# Patient Record
Sex: MALE | Race: WHITE | NOT HISPANIC OR LATINO | Employment: FULL TIME | URBAN - METROPOLITAN AREA
[De-identification: names, ages, dates, MRNs, and addresses within clinical notes are randomized per-mention and may not be internally consistent; named-entity substitution may affect disease eponyms.]

---

## 2018-05-01 ENCOUNTER — OFFICE VISIT (OUTPATIENT)
Dept: MEDICAL GROUP | Facility: PHYSICIAN GROUP | Age: 48
End: 2018-05-01
Payer: COMMERCIAL

## 2018-05-01 VITALS
SYSTOLIC BLOOD PRESSURE: 124 MMHG | HEIGHT: 70 IN | OXYGEN SATURATION: 97 % | WEIGHT: 145 LBS | RESPIRATION RATE: 14 BRPM | DIASTOLIC BLOOD PRESSURE: 70 MMHG | BODY MASS INDEX: 20.76 KG/M2 | TEMPERATURE: 98.1 F | HEART RATE: 86 BPM

## 2018-05-01 DIAGNOSIS — Z23 NEED FOR VACCINATION: ICD-10-CM

## 2018-05-01 DIAGNOSIS — Z00.00 PREVENTATIVE HEALTH CARE: ICD-10-CM

## 2018-05-01 DIAGNOSIS — Z72.0 TOBACCO ABUSE: ICD-10-CM

## 2018-05-01 DIAGNOSIS — F33.41 RECURRENT MAJOR DEPRESSIVE DISORDER, IN PARTIAL REMISSION (HCC): ICD-10-CM

## 2018-05-01 PROCEDURE — 90715 TDAP VACCINE 7 YRS/> IM: CPT | Performed by: NURSE PRACTITIONER

## 2018-05-01 PROCEDURE — 90471 IMMUNIZATION ADMIN: CPT | Performed by: NURSE PRACTITIONER

## 2018-05-01 PROCEDURE — 99203 OFFICE O/P NEW LOW 30 MIN: CPT | Mod: 25 | Performed by: NURSE PRACTITIONER

## 2018-05-01 ASSESSMENT — PAIN SCALES - GENERAL: PAINLEVEL: NO PAIN

## 2018-05-01 ASSESSMENT — PATIENT HEALTH QUESTIONNAIRE - PHQ9: CLINICAL INTERPRETATION OF PHQ2 SCORE: 0

## 2018-05-01 NOTE — ASSESSMENT & PLAN NOTE
Has not been able to afford chantix since insurance changed.  Would like to stop smoking.  Did not do well on Wellbutrin in the past.

## 2018-05-01 NOTE — PROGRESS NOTES
"Gera Velez is a 47 y.o. male here today to establish care and for evaluation and management of:    HPI:      Recurrent major depressive disorder, in partial remission (HCC)  Reports ups and downs every 2-3 weeks.  Some suicidal thoughts. Feels safe at this time.  Looking at CBD oil, does not want to do any meds at this time.     Tobacco abuse  Has not been able to afford chantix since insurance changed.  Would like to stop smoking.  Did not do well on Wellbutrin in the past.       Current medicines (including changes today)  No current outpatient prescriptions on file.     No current facility-administered medications for this visit.        He  has a past medical history of Allergy and Depression.    He  has no past surgical history on file.    Social History   Substance Use Topics   • Smoking status: Current Every Day Smoker     Packs/day: 0.50     Years: 33.00     Types: Cigarettes   • Smokeless tobacco: Never Used   • Alcohol use 4.2 oz/week     7 Glasses of wine per week       Social History     Social History Narrative   • No narrative on file       Family History   Problem Relation Age of Onset   • Diabetes Mother    • Cancer Mother      brain   • Cancer Father      colon       Family Status   Relation Status   • Mother    • Father          ROS  As stated in hpi  All other systems reviewed and are negative     Objective:     Blood pressure 124/70, pulse 86, temperature 36.7 °C (98.1 °F), resp. rate 14, height 1.778 m (5' 10\"), weight 65.8 kg (145 lb), SpO2 97 %. Body mass index is 20.81 kg/m².  Physical Exam:    Constitutional: Alert, no distress.  Skin: Warm, dry, good turgor, no rashes in visible areas.  Eye: Equal, round and reactive, conjunctiva clear, lids normal.  ENMT: Lips without lesions, good dentition, oropharynx clear.  Neck: Trachea midline, no masses, no thyromegaly. No cervical or supraclavicular lymphadenopathy.  Respiratory: Unlabored respiratory effort, lungs clear to " auscultation, no wheezes, no ronchi.  Cardiovascular: Normal S1, S2, no murmur, no edema.  Abdomen: Soft, non-tender, no masses, no hepatosplenomegaly.  Psych: Alert and oriented x3, normal affect and mood. Denies suicidal ideation, plan or history of previous suicide attempt.         Assessment and Plan:   The following treatment plan was discussed    1. Tobacco abuse  This is a new problem to me.  Chronic, ongoing, unstable.  Discussed smoking cessation with patient.  He is working on decreasing cigarettes at this time.  Unable to afford chantix with current insurance.  Monitor and follow.     2. Recurrent major depressive disorder, in partial remission (HCC)  This is a new problem to me.  Chronic, ongoing.  Patient does not want any pharmacological intervention or therapy at this time.  Is researching CBD oil as a treatment.  Reviewed red flag warnings.  Monitor and follow.     3. Preventative health care  Due for lab work.  Will monitor for any metabolic issues.  Monitor and follow.   - CBC WITH DIFFERENTIAL; Future  - COMP METABOLIC PANEL; Future  - LIPID PROFILE; Future  - TSH; Future  - VITAMIN D,25 HYDROXY; Future    4. Need for vaccination  Due for TDAP today.  No acute illness, consent obtained.  I have placed the below orders and discussed them with an approved delegating provider.  The MA is performing the below orders under the direction of Ashanti Hernandez D.O..    - TDAP VACCINE =>6YO IM      Records requested.  Followup: Return in about 1 year (around 5/1/2019), or if symptoms worsen or fail to improve, for Annual.

## 2018-05-01 NOTE — LETTER
[facility]  LALO Ayala  [address] [place] [address]  Fax: [phone]   Authorization for Release/Disclosure of   Protected Health Information   Name: GERA VELEZ : 1970 SSN: [id number]   Address: [address] [place] [address] [place] [address] Phone:    [phone] (home)    I authorize the entity listed below to release/disclose the PHI below to:   [facility]/LALO Ayala and LALO Ayala   Provider or Entity Name:  Dr Armas   Address   City, [facility]   Phone:      Fax:     Reason for request: continuity of care   Information to be released:    [  ] LAST COLONOSCOPY,  including any PATH REPORT and follow-up  [  ] LAST FIT/COLOGUARD RESULT [  ] LAST DEXA  [  ] LAST MAMMOGRAM  [  ] LAST PAP  [  ] LAST LABS [  ] RETINA EXAM REPORT  [  ] IMMUNIZATION RECORDS  [ x ] Release all info      [  ] Check here and initial the line next to each item to release ALL health information INCLUDING  _____ Care and treatment for drug and / or alcohol abuse  _____ HIV testing, infection status, or AIDS  _____ Genetic Testing    DATES OF SERVICE OR TIME PERIOD TO BE DISCLOSED: _____________  I understand and acknowledge that:  * This Authorization may be revoked at any time by you in writing, except if your health information has already been used or disclosed.  * Your health information that will be used or disclosed as a result of you signing this authorization could be re-disclosed by the recipient. If this occurs, your re-disclosed health information may no longer be protected by State or Federal laws.  * You may refuse to sign this Authorization. Your refusal will not affect your ability to obtain treatment.  * This Authorization becomes effective upon signing and will  on (date) __________.      If no date is indicated, this Authorization will  one (1) year from the signature date.    Name: Gera Velez    Signature:   Date:     2018            PLEASE FAX REQUESTED RECORDS BACK TO: [phone]

## 2018-05-01 NOTE — ASSESSMENT & PLAN NOTE
Reports ups and downs every 2-3 weeks.  Some suicidal thoughts. Feels safe at this time.  Looking at CBD oil, does not want to do any meds at this time.

## 2018-05-04 LAB
25(OH)D3+25(OH)D2 SERPL-MCNC: 37.6 NG/ML (ref 30–100)
ALBUMIN SERPL-MCNC: 4.5 G/DL (ref 3.5–5.5)
ALBUMIN/GLOB SERPL: 1.8 {RATIO} (ref 1.2–2.2)
ALP SERPL-CCNC: 62 IU/L (ref 39–117)
ALT SERPL-CCNC: 16 IU/L (ref 0–44)
AST SERPL-CCNC: 21 IU/L (ref 0–40)
BASOPHILS # BLD AUTO: 0 X10E3/UL (ref 0–0.2)
BASOPHILS NFR BLD AUTO: 0 %
BILIRUB SERPL-MCNC: 0.8 MG/DL (ref 0–1.2)
BUN SERPL-MCNC: 9 MG/DL (ref 6–24)
BUN/CREAT SERPL: 10 (ref 9–20)
CALCIUM SERPL-MCNC: 9.5 MG/DL (ref 8.7–10.2)
CHLORIDE SERPL-SCNC: 101 MMOL/L (ref 96–106)
CHOLEST SERPL-MCNC: 195 MG/DL (ref 100–199)
CO2 SERPL-SCNC: 25 MMOL/L (ref 18–29)
CREAT SERPL-MCNC: 0.93 MG/DL (ref 0.76–1.27)
EOSINOPHIL # BLD AUTO: 0.1 X10E3/UL (ref 0–0.4)
EOSINOPHIL NFR BLD AUTO: 1 %
ERYTHROCYTE [DISTWIDTH] IN BLOOD BY AUTOMATED COUNT: 13 % (ref 12.3–15.4)
GFR SERPLBLD CREATININE-BSD FMLA CKD-EPI: 112 ML/MIN/1.73
GFR SERPLBLD CREATININE-BSD FMLA CKD-EPI: 97 ML/MIN/1.73
GLOBULIN SER CALC-MCNC: 2.5 G/DL (ref 1.5–4.5)
GLUCOSE SERPL-MCNC: 94 MG/DL (ref 65–99)
HCT VFR BLD AUTO: 44 % (ref 37.5–51)
HDLC SERPL-MCNC: 75 MG/DL
HGB BLD-MCNC: 14.8 G/DL (ref 13–17.7)
IMM GRANULOCYTES # BLD: 0 X10E3/UL (ref 0–0.1)
IMM GRANULOCYTES NFR BLD: 0 %
IMMATURE CELLS  115398: NORMAL
LABORATORY COMMENT REPORT: ABNORMAL
LDLC SERPL CALC-MCNC: 100 MG/DL (ref 0–99)
LYMPHOCYTES # BLD AUTO: 2 X10E3/UL (ref 0.7–3.1)
LYMPHOCYTES NFR BLD AUTO: 26 %
MCH RBC QN AUTO: 32 PG (ref 26.6–33)
MCHC RBC AUTO-ENTMCNC: 33.6 G/DL (ref 31.5–35.7)
MCV RBC AUTO: 95 FL (ref 79–97)
MONOCYTES # BLD AUTO: 0.5 X10E3/UL (ref 0.1–0.9)
MONOCYTES NFR BLD AUTO: 7 %
MORPHOLOGY BLD-IMP: NORMAL
NEUTROPHILS # BLD AUTO: 5.1 X10E3/UL (ref 1.4–7)
NEUTROPHILS NFR BLD AUTO: 66 %
NRBC BLD AUTO-RTO: NORMAL %
PLATELET # BLD AUTO: 248 X10E3/UL (ref 150–379)
POTASSIUM SERPL-SCNC: 5.2 MMOL/L (ref 3.5–5.2)
PROT SERPL-MCNC: 7 G/DL (ref 6–8.5)
RBC # BLD AUTO: 4.62 X10E6/UL (ref 4.14–5.8)
SODIUM SERPL-SCNC: 143 MMOL/L (ref 134–144)
TRIGL SERPL-MCNC: 102 MG/DL (ref 0–149)
TSH SERPL DL<=0.005 MIU/L-ACNC: 2.28 UIU/ML (ref 0.45–4.5)
VLDLC SERPL CALC-MCNC: 20 MG/DL (ref 5–40)
WBC # BLD AUTO: 7.7 X10E3/UL (ref 3.4–10.8)

## 2018-05-08 ENCOUNTER — TELEPHONE (OUTPATIENT)
Dept: MEDICAL GROUP | Facility: PHYSICIAN GROUP | Age: 48
End: 2018-05-08

## 2018-05-08 NOTE — TELEPHONE ENCOUNTER
----- Message from LALO Ayala sent at 5/7/2018  4:43 PM PDT -----  Please notify patient that blood sugar, kidney function, liver function, cholesterol, vitamin D, thyroid are all within normal range.  Great job!  LALO Ayala

## 2018-05-08 NOTE — TELEPHONE ENCOUNTER
Phone Number Called: [phone] (home)     Message: Left message to call back, patient notified of normal result    Left Message for patient to call back: N\A

## 2018-11-16 ENCOUNTER — TELEPHONE (OUTPATIENT)
Dept: MEDICAL GROUP | Facility: PHYSICIAN GROUP | Age: 48
End: 2018-11-16

## 2018-11-16 DIAGNOSIS — Z23 NEED FOR VACCINATION: ICD-10-CM

## 2018-11-16 NOTE — TELEPHONE ENCOUNTER
1. Caller Name:                                          Call Back Number:       Patient approves a detailed voicemail message: N\A    Patient is on the MA Schedule 11/19/18 for flu, pneumovax 23 vaccine/injection.    SPECIFIC Action To Be Taken: Orders pending, please sign.

## 2018-11-19 ENCOUNTER — NON-PROVIDER VISIT (OUTPATIENT)
Dept: MEDICAL GROUP | Facility: PHYSICIAN GROUP | Age: 48
End: 2018-11-19
Payer: COMMERCIAL

## 2018-11-19 DIAGNOSIS — Z23 NEED FOR VACCINATION: ICD-10-CM

## 2018-11-19 PROCEDURE — 90472 IMMUNIZATION ADMIN EACH ADD: CPT | Performed by: NURSE PRACTITIONER

## 2018-11-19 PROCEDURE — 90686 IIV4 VACC NO PRSV 0.5 ML IM: CPT | Performed by: NURSE PRACTITIONER

## 2018-11-19 PROCEDURE — 90732 PPSV23 VACC 2 YRS+ SUBQ/IM: CPT | Performed by: NURSE PRACTITIONER

## 2018-11-19 PROCEDURE — 90471 IMMUNIZATION ADMIN: CPT | Performed by: NURSE PRACTITIONER

## 2018-11-19 NOTE — PROGRESS NOTES
"Gera Velez is a 48 y.o. male here for a non-provider visit for:   FLU    Reason for immunization: Annual Flu Vaccine  Immunization records indicate need for vaccine: Yes, confirmed with Epic  Minimum interval has been met for this vaccine: Yes  ABN completed: Not Indicated    Order and dose verified by: JOVANNI ADAME Dated  8/1/2015 was given to patient: Yes  All IAC Questionnaire questions were answered \"No.\"    Patient tolerated injection and no adverse effects were observed or reported: Yes    Pt scheduled for next dose in series: Not Indicated    Gera Velez is a 48 y.o. male here for a non-provider visit for:   PNEUMOVAX (PPSV23)    Reason for immunization: Overdue/Provider Recommended  Immunization records indicate need for vaccine: Yes, confirmed with Epic  Minimum interval has been met for this vaccine: Yes  ABN completed: Not Indicated    Order and dose verified by: JOVANNI ADAME Dated  4/24/2015 was given to patient: Yes  All IAC Questionnaire questions were answered \"No.\"    Patient tolerated injection and no adverse effects were observed or reported: Yes    Pt scheduled for next dose in series: Not Indicated  "

## 2019-05-29 ENCOUNTER — HOSPITAL ENCOUNTER (EMERGENCY)
Facility: MEDICAL CENTER | Age: 49
End: 2019-05-29
Attending: EMERGENCY MEDICINE
Payer: COMMERCIAL

## 2019-05-29 VITALS
WEIGHT: 141.09 LBS | DIASTOLIC BLOOD PRESSURE: 79 MMHG | BODY MASS INDEX: 20.2 KG/M2 | SYSTOLIC BLOOD PRESSURE: 109 MMHG | TEMPERATURE: 97.7 F | OXYGEN SATURATION: 99 % | HEIGHT: 70 IN | HEART RATE: 62 BPM | RESPIRATION RATE: 18 BRPM

## 2019-05-29 DIAGNOSIS — L02.91 ABSCESS: ICD-10-CM

## 2019-05-29 DIAGNOSIS — L03.011 CELLULITIS OF FINGER OF RIGHT HAND: ICD-10-CM

## 2019-05-29 PROCEDURE — 99283 EMERGENCY DEPT VISIT LOW MDM: CPT

## 2019-05-29 PROCEDURE — 303977 HCHG I & D

## 2019-05-29 PROCEDURE — 700102 HCHG RX REV CODE 250 W/ 637 OVERRIDE(OP): Performed by: EMERGENCY MEDICINE

## 2019-05-29 PROCEDURE — A9270 NON-COVERED ITEM OR SERVICE: HCPCS | Performed by: EMERGENCY MEDICINE

## 2019-05-29 RX ORDER — SULFAMETHOXAZOLE AND TRIMETHOPRIM 800; 160 MG/1; MG/1
1 TABLET ORAL 2 TIMES DAILY
Qty: 10 TAB | Refills: 0 | Status: SHIPPED | OUTPATIENT
Start: 2019-05-29 | End: 2019-06-03

## 2019-05-29 RX ORDER — SULFAMETHOXAZOLE AND TRIMETHOPRIM 800; 160 MG/1; MG/1
1 TABLET ORAL ONCE
Status: COMPLETED | OUTPATIENT
Start: 2019-05-29 | End: 2019-05-29

## 2019-05-29 RX ADMIN — SULFAMETHOXAZOLE AND TRIMETHOPRIM 1 TABLET: 800; 160 TABLET ORAL at 19:02

## 2019-05-30 NOTE — ED PROVIDER NOTES
ED Provider Note    Scribed for Matias Perez M.D. by Erendira Jacobs. 5/29/2019,  6:35 PM.    Means of Arrival: Walk in  History obtained from: Patient  History limited by: None    CHIEF COMPLAINT  Chief Complaint   Patient presents with   • Digit Pain     right index finger, swelling and redness noted.        HPI  Gera Velez is a 48 y.o. male who presents to the Emergency Department complaining of left hand digit pain onset 05/21/19. The patient states he was working on an electrical box at home when he got his finger caught in the electrical wires. He then tried to extract his hand from the electrical box and debris became stuck under the fingernail of his index finger of his left hand.  He states he did not get electrocuted. The patient states he cut the the nail down and believed the issue would resolve on its own. He has since developed worsening digit pain radiating up to the knuckle onset yesterday. He has an associated abscess on the fingernail.  The patient denies fever. No alleviating or exacerbating factors noted. He has no major medical problems to report and an allergy to penicillin.     REVIEW OF SYSTEMS  CONSTITUTIONAL:  No fever.  Skin: positive for abscess and digit pain ( index finger of left hand)     See HPI for further details.     PAST MEDICAL HISTORY  Past Medical History:   Diagnosis Date   • Allergy    • Depression        FAMILY HISTORY  Family History   Problem Relation Age of Onset   • Diabetes Mother    • Cancer Mother         brain   • Cancer Father         colon       SOCIAL HISTORY   reports that he has been smoking Cigarettes.  He has a 16.50 pack-year smoking history. He has never used smokeless tobacco. He reports that he drinks about 4.2 oz of alcohol per week . He reports that he uses drugs, including Marijuana.    SURGICAL HISTORY  No past surgical history on file.    CURRENT MEDICATIONS  Home Medications     Reviewed by Tiffanie England R.N. (Registered Nurse) on  "05/29/19 at 1802  Med List Status: Complete   Medication Last Dose Status        Patient Shaun Taking any Medications                       ALLERGIES  Allergies   Allergen Reactions   • Pcn [Penicillins] Unspecified     Patent does not Know         PHYSICAL EXAM  VITAL SIGNS: /67   Pulse 61   Temp 36.5 °C (97.7 °F) (Temporal)   Resp 18   Ht 1.778 m (5' 10\")   Wt 64 kg (141 lb 1.5 oz)   SpO2 99%   BMI 20.24 kg/m²    Gen: alert, no acute distress  HENT: ATNC  Eyes: normal conjuctiva  Resp: No resipiratory distress.   CV: No JVD   Abd: Non-distended  Skin: 1 cm abscess to the fingernail of the distal fingertip, with mild erythema to the  DIP joint   Extremities: No deformity      DIAGNOSTIC STUDIES / PROCEDURES     Incision and drainage  Indication: Abscess  The patient's skin was cleaned with ChloraPrep.  The abscess was opened using an 18-gauge needle as a cutting item to make approximately 7 mm incision, with drainage of approximately 1 to 2 mL's of pus and a small amount of blood.  Patient tolerated the procedure well.  There were no immediate complications.    EBL: 1 mL    COURSE & MEDICAL DECISION MAKING  Pertinent Labs & Imaging studies reviewed. (See chart for details)    6:35 PM Patient seen and examined at bedside. Patient will be treated with Bactrim 800-160 mg for his symptoms.     6:40 PM  The abscess was drained by myself at this time. A 7 cm incision was made and pus was produced.  Sterile dressing was applied. The patient is instructed to keep the area clean and dry. The patient was told to return in 2 days to take the packing out. The patient was told to return if there were increasing signs of infection, such as increasing redness or pain, nausea, vomiting or worsening fever. The patient was given the chance to ask questions. He is understanding and agreeable to discharge.       Medical Decision Making:  Patient presents with abscess of the fingertip.  This was drained at the bedside.  He " does have some surrounding cellulitis, no evidence of flexor tenosynovitis.  She will be discharged home on antibiotics.  He was provided with return precautions     The patient will return for new or worsening symptoms and is stable at the time of discharge.      DISPOSITION:  Patient will be discharged home in stable condition.    FOLLOW UP:  LALO Ayala  [address] [address]  [phone]    In 3 days        OUTPATIENT MEDICATIONS:  Discharge Medication List as of 5/29/2019  7:04 PM      START taking these medications    Details   sulfamethoxazole-trimethoprim (BACTRIM DS) 800-160 MG tablet Take 1 Tab by mouth 2 times a day for 5 days., Disp-10 Tab, R-0, Normal               FINAL IMPRESSION  1. Cellulitis of finger of right hand    2. Abscess          Erendira LAMAR (Scribe), am scribing for, and in the presence of, Matias Perez M.D..    Electronically signed by: Erendira Jacobs (Scribe), 5/29/2019    IMatias M.D. personally performed the services described in this documentation, as scribed by Erendira Jacobs in my presence, and it is both accurate and complete.  E  The note accurately reflects work and decisions made by me.  Matias Perez  5/29/2019  8:55 PM

## 2019-05-30 NOTE — DISCHARGE INSTRUCTIONS
You were seen in the emergency department for an infection in your skin.  This was drained at the bedside.  Please perform warm soaks of your finger several times a day and massage out any pus.  You are being started on an antibiotic to take for several days.  Please take this as prescribed.    Please follow-up with your regular doctor to ensure that you are recovering well.    Please return to the emergency department or seek medical attention if you develop:  Fevers, spreading redness, increasing pain, any other new or concerning findings

## 2019-05-30 NOTE — ED TRIAGE NOTES
Gera Velez  Chief Complaint   Patient presents with   • Digit Pain     right index finger, swelling and redness noted.      Pt ambulatory to triage with above complaint.  VSS.  Pt states injury under (R) index nail,  Increased redness and swelling over last week.  Pt returned to lobby, educated on triage process, and to inform staff of any changes or concerns.

## 2019-06-04 ENCOUNTER — OFFICE VISIT (OUTPATIENT)
Dept: MEDICAL GROUP | Facility: PHYSICIAN GROUP | Age: 49
End: 2019-06-04
Payer: COMMERCIAL

## 2019-06-04 VITALS
HEIGHT: 70 IN | BODY MASS INDEX: 20.04 KG/M2 | WEIGHT: 140 LBS | RESPIRATION RATE: 14 BRPM | TEMPERATURE: 98.9 F | SYSTOLIC BLOOD PRESSURE: 102 MMHG | DIASTOLIC BLOOD PRESSURE: 58 MMHG | OXYGEN SATURATION: 96 % | HEART RATE: 55 BPM

## 2019-06-04 DIAGNOSIS — L03.011 CELLULITIS OF RIGHT INDEX FINGER: ICD-10-CM

## 2019-06-04 PROCEDURE — 99213 OFFICE O/P EST LOW 20 MIN: CPT | Performed by: NURSE PRACTITIONER

## 2019-06-04 NOTE — ASSESSMENT & PLAN NOTE
Was seen in ER last week for abscess right index finger.  It was drained and patient started on BactrimDS.  He has one dose left.  Presents today for ER follow up.  Finger is healing nicely, some clear drainage noted.  No fever, some pain with squeezing.  Not currently doing warm soaks.

## 2019-06-04 NOTE — PROGRESS NOTES
"Chief Complaint   Patient presents with   • Hospital Follow-up   • Finger Injury       Subjective:   Gera Velez is a 48 y.o. male here today for evaluation and management of:    Cellulitis of right index finger  Was seen in ER last week for abscess right index finger.  It was drained and patient started on BactrimDS.  He has one dose left.  Presents today for ER follow up.  Finger is healing nicely, some clear drainage noted.  No fever, some pain with squeezing.  Not currently doing warm soaks.           Current medicines (including changes today)  No current outpatient prescriptions on file.     No current facility-administered medications for this visit.      He  has a past medical history of Allergy and Depression.    ROS as stated in hpi  No chest pain, no shortness of breath, no abdominal pain       Objective:     /58 (BP Location: Left arm, Patient Position: Sitting)   Pulse (!) 55   Temp 37.2 °C (98.9 °F) (Temporal)   Resp 14   Ht 1.778 m (5' 10\")   Wt 63.5 kg (140 lb)   SpO2 96%  Body mass index is 20.09 kg/m². afebrile  Physical Exam:  Constitutional: Alert, no distress.  Skin: Warm, dry, good turgor,no cyanosis, no rashes in visible areas. Right index finger tip with healing lesion.  No streaking, pus or foreign body noted at this time.   Eye: Equal, round and reactive, conjunctiva clear, lids normal.  Ears: No tenderness, no discharge.  External canals are without any significant edema or erythema.   Gross auditory acuity is intact.  Nose: symmetrical without tenderness, no discharge.  Mouth/Throat: lips without lesion.  Oropharynx clear.  Neck: Trachea midline, no masses, no obvious thyroid enlargement Range of motion within normal limits.  Neuro: Cranial nerves 2-12 grossly intact.  No sensory deficit.  Psych: Alert and oriented x3, normal affect and mood and judgement.        Assessment and Plan:   The following treatment plan was discussed    1. Cellulitis of right index " finger  This is a new problem to me.  Acute, ER follow up.  Improving and clear exudate is seen.  Finish Bactrim as prescribed.  Advised to add warm epsom salt soaks bid.  Red flags reviewed.  Monitor and follow.       Followup: Return if symptoms worsen or fail to improve.         Educated in proper administration of medication(s) ordered today including safety, possible SE, risks, benefits, rationale and alternatives to therapy.     Please note that this dictation was created using voice recognition software. I have made every reasonable attempt to correct obvious errors, but I expect that there are errors of grammar and possibly content that I did not discover before finalizing the note.

## 2019-07-25 ENCOUNTER — PATIENT MESSAGE (OUTPATIENT)
Dept: MEDICAL GROUP | Facility: PHYSICIAN GROUP | Age: 49
End: 2019-07-25

## 2020-06-01 ENCOUNTER — OFFICE VISIT (OUTPATIENT)
Dept: MEDICAL GROUP | Facility: PHYSICIAN GROUP | Age: 50
End: 2020-06-01
Payer: COMMERCIAL

## 2020-06-01 VITALS
WEIGHT: 145 LBS | DIASTOLIC BLOOD PRESSURE: 70 MMHG | HEIGHT: 70 IN | BODY MASS INDEX: 20.76 KG/M2 | TEMPERATURE: 98.5 F | SYSTOLIC BLOOD PRESSURE: 110 MMHG | HEART RATE: 60 BPM | OXYGEN SATURATION: 97 % | RESPIRATION RATE: 16 BRPM

## 2020-06-01 DIAGNOSIS — F33.41 RECURRENT MAJOR DEPRESSIVE DISORDER, IN PARTIAL REMISSION (HCC): ICD-10-CM

## 2020-06-01 PROCEDURE — 99214 OFFICE O/P EST MOD 30 MIN: CPT | Performed by: NURSE PRACTITIONER

## 2020-06-01 ASSESSMENT — PATIENT HEALTH QUESTIONNAIRE - PHQ9
7. TROUBLE CONCENTRATING ON THINGS, SUCH AS READING THE NEWSPAPER OR WATCHING TELEVISION: MORE THAN HALF THE DAYS
6. FEELING BAD ABOUT YOURSELF - OR THAT YOU ARE A FAILURE OR HAVE LET YOURSELF OR YOUR FAMILY DOWN: MORE THAN HALF THE DAYS
SUM OF ALL RESPONSES TO PHQ9 QUESTIONS 1 AND 2: 3
8. MOVING OR SPEAKING SO SLOWLY THAT OTHER PEOPLE COULD HAVE NOTICED. OR THE OPPOSITE, BEING SO FIGETY OR RESTLESS THAT YOU HAVE BEEN MOVING AROUND A LOT MORE THAN USUAL: SEVERAL DAYS
5. POOR APPETITE OR OVEREATING: MORE THAN HALF THE DAYS
4. FEELING TIRED OR HAVING LITTLE ENERGY: MORE THAN HALF THE DAYS
1. LITTLE INTEREST OR PLEASURE IN DOING THINGS: SEVERAL DAYS
3. TROUBLE FALLING OR STAYING ASLEEP OR SLEEPING TOO MUCH: MORE THAN HALF THE DAYS
9. THOUGHTS THAT YOU WOULD BE BETTER OFF DEAD, OR OF HURTING YOURSELF: SEVERAL DAYS
2. FEELING DOWN, DEPRESSED, IRRITABLE, OR HOPELESS: MORE THAN HALF THE DAYS
SUM OF ALL RESPONSES TO PHQ QUESTIONS 1-9: 15

## 2020-06-01 NOTE — ASSESSMENT & PLAN NOTE
"Reports worsening depression with waves of peaks and valleys.  Seems to be 90 days cycle.  No manic behaviors reported.  Lack of energy, lack of interest in food, guilt associated.  Has in the past only exercised, never seen a therapist. Reports recent 6 months-1 year of difficulty concentrating, dyslexia.   No reported suicidal plan, but thinks about 'what if I just hung myself in my closet\".  No previous suicide attempts.  Took wellbutrin for smoking cessation.  Afraid of taking medication   No reported panic attacks. Some random chest pain reported over the last year.  No shortness of breath reported, numbness or tingling.  Denies hallucinations.   "

## 2020-06-01 NOTE — PROGRESS NOTES
"Chief Complaint   Patient presents with   • Follow-Up   • Depression       Subjective:   Gera Velez is a 49 y.o. male here today for evaluation and management of:    Recurrent major depressive disorder, in partial remission (HCC)  Reports worsening depression with waves of peaks and valleys.  Seems to be 90 days cycle.  No manic behaviors reported.  Lack of energy, lack of interest in food, guilt associated.  Has in the past only exercised, never seen a therapist. Reports recent 6 months-1 year of difficulty concentrating, dyslexia.   No reported suicidal plan, but thinks about 'what if I just hung myself in my closet\".  No previous suicide attempts.  Took wellbutrin for smoking cessation.  Afraid of taking medication   No reported panic attacks. Some random chest pain reported over the last year.  No shortness of breath reported, numbness or tingling.  Denies hallucinations.            Current medicines (including changes today)  No current outpatient medications on file.     No current facility-administered medications for this visit.      He  has a past medical history of Allergy and Depression.    ROS as stated in hpi  No chest pain, no shortness of breath, no abdominal pain       Objective:     /70 (BP Location: Left arm, Patient Position: Sitting)   Pulse 60   Temp 36.9 °C (98.5 °F) (Temporal)   Resp 16   Ht 1.778 m (5' 10\")   Wt 65.8 kg (145 lb)   SpO2 97%  Body mass index is 20.81 kg/m².   Physical Exam:  Constitutional: Alert, no distress.  Skin: Warm, dry, good turgor,no cyanosis, no rashes in visible areas.  Eye: Equal, round and reactive, conjunctiva clear, lids normal.  Ears: No tenderness, no discharge.  External canals are without any significant edema or erythema.  Tympanic membranes are without any inflammation, no effusion.  Gross auditory acuity is intact.  Nose: symmetrical without tenderness, no discharge.  Mouth/Throat: lips without lesion.  Oropharynx clear.  Throat " without erythema, exudates or tonsillar enlargement.  Neck: Trachea midline, no masses, no obvious thyroid enlargement.. No cervical or supraclavicular lymphadenopathy. Range of motion within normal limits.  Neuro: Cranial nerves 2-12 grossly intact.  No sensory deficit.  Respiratory: Unlabored respiratory effort, lungs clear to auscultation, no wheezes, no ronchi.  Cardiovascular: Normal S1, S2, no murmur, no edema.  Abdomen: Soft, non-tender, no masses, no guarding,  no hepatosplenomegaly.  Psych: Alert and oriented x3, normal affect and mood and judgement.        Assessment and Plan:   The following treatment plan was discussed    1. Recurrent major depressive disorder, in partial remission (HCC)  New problem to me.  Acute exacerbation of chronic issue.  Labs ordered. Referral to behavioral health.  Discussed importance of therapy.  Discussed possible medications.  Will rule out any metabolic cause.  Monitor and follow.  ER precautions reviewed at length with patient.  Monitor and follow closely.   - CBC WITH DIFFERENTIAL; Future  - Comp Metabolic Panel; Future  - TSH; Future  - FREE THYROXINE; Future  - VITAMIN D,25 HYDROXY; Future  - Lipid Profile; Future  - REFERRAL TO BEHAVIORAL HEALTH      Followup: Return in about 2 months (around 8/1/2020) for depression.         Educated in proper administration of medication(s) ordered today including safety, possible SE, risks, benefits, rationale and alternatives to therapy.     Please note that this dictation was created using voice recognition software. I have made every reasonable attempt to correct obvious errors, but I expect that there are errors of grammar and possibly content that I did not discover before finalizing the note.

## 2020-06-02 ENCOUNTER — HOSPITAL ENCOUNTER (OUTPATIENT)
Dept: LAB | Facility: MEDICAL CENTER | Age: 50
End: 2020-06-02
Attending: NURSE PRACTITIONER
Payer: COMMERCIAL

## 2020-06-02 DIAGNOSIS — F33.41 RECURRENT MAJOR DEPRESSIVE DISORDER, IN PARTIAL REMISSION (HCC): ICD-10-CM

## 2020-06-02 LAB
25(OH)D3 SERPL-MCNC: 41 NG/ML (ref 30–100)
ALBUMIN SERPL BCP-MCNC: 4.7 G/DL (ref 3.2–4.9)
ALBUMIN/GLOB SERPL: 2 G/DL
ALP SERPL-CCNC: 59 U/L (ref 30–99)
ALT SERPL-CCNC: 18 U/L (ref 2–50)
ANION GAP SERPL CALC-SCNC: 10 MMOL/L (ref 7–16)
AST SERPL-CCNC: 20 U/L (ref 12–45)
BASOPHILS # BLD AUTO: 1.2 % (ref 0–1.8)
BASOPHILS # BLD: 0.06 K/UL (ref 0–0.12)
BILIRUB SERPL-MCNC: 0.6 MG/DL (ref 0.1–1.5)
BUN SERPL-MCNC: 14 MG/DL (ref 8–22)
CALCIUM SERPL-MCNC: 9.5 MG/DL (ref 8.5–10.5)
CHLORIDE SERPL-SCNC: 102 MMOL/L (ref 96–112)
CHOLEST SERPL-MCNC: 222 MG/DL (ref 100–199)
CO2 SERPL-SCNC: 28 MMOL/L (ref 20–33)
CREAT SERPL-MCNC: 0.82 MG/DL (ref 0.5–1.4)
EOSINOPHIL # BLD AUTO: 0.06 K/UL (ref 0–0.51)
EOSINOPHIL NFR BLD: 1.2 % (ref 0–6.9)
ERYTHROCYTE [DISTWIDTH] IN BLOOD BY AUTOMATED COUNT: 41.3 FL (ref 35.9–50)
FASTING STATUS PATIENT QL REPORTED: NORMAL
GLOBULIN SER CALC-MCNC: 2.3 G/DL (ref 1.9–3.5)
GLUCOSE SERPL-MCNC: 94 MG/DL (ref 65–99)
HCT VFR BLD AUTO: 46.2 % (ref 42–52)
HDLC SERPL-MCNC: 78 MG/DL
HGB BLD-MCNC: 15 G/DL (ref 14–18)
IMM GRANULOCYTES # BLD AUTO: 0.03 K/UL (ref 0–0.11)
IMM GRANULOCYTES NFR BLD AUTO: 0.6 % (ref 0–0.9)
LDLC SERPL CALC-MCNC: 117 MG/DL
LYMPHOCYTES # BLD AUTO: 1.78 K/UL (ref 1–4.8)
LYMPHOCYTES NFR BLD: 35 % (ref 22–41)
MCH RBC QN AUTO: 31.6 PG (ref 27–33)
MCHC RBC AUTO-ENTMCNC: 32.5 G/DL (ref 33.7–35.3)
MCV RBC AUTO: 97.3 FL (ref 81.4–97.8)
MONOCYTES # BLD AUTO: 0.33 K/UL (ref 0–0.85)
MONOCYTES NFR BLD AUTO: 6.5 % (ref 0–13.4)
NEUTROPHILS # BLD AUTO: 2.83 K/UL (ref 1.82–7.42)
NEUTROPHILS NFR BLD: 55.5 % (ref 44–72)
NRBC # BLD AUTO: 0 K/UL
NRBC BLD-RTO: 0 /100 WBC
PLATELET # BLD AUTO: 222 K/UL (ref 164–446)
PMV BLD AUTO: 9.5 FL (ref 9–12.9)
POTASSIUM SERPL-SCNC: 4.5 MMOL/L (ref 3.6–5.5)
PROT SERPL-MCNC: 7 G/DL (ref 6–8.2)
RBC # BLD AUTO: 4.75 M/UL (ref 4.7–6.1)
SODIUM SERPL-SCNC: 140 MMOL/L (ref 135–145)
T4 FREE SERPL-MCNC: 1.31 NG/DL (ref 0.93–1.7)
TRIGL SERPL-MCNC: 136 MG/DL (ref 0–149)
TSH SERPL DL<=0.005 MIU/L-ACNC: 2.1 UIU/ML (ref 0.38–5.33)
WBC # BLD AUTO: 5.1 K/UL (ref 4.8–10.8)

## 2020-06-02 PROCEDURE — 84439 ASSAY OF FREE THYROXINE: CPT

## 2020-06-02 PROCEDURE — 85025 COMPLETE CBC W/AUTO DIFF WBC: CPT

## 2020-06-02 PROCEDURE — 80053 COMPREHEN METABOLIC PANEL: CPT

## 2020-06-02 PROCEDURE — 36415 COLL VENOUS BLD VENIPUNCTURE: CPT

## 2020-06-02 PROCEDURE — 80061 LIPID PANEL: CPT

## 2020-06-02 PROCEDURE — 82306 VITAMIN D 25 HYDROXY: CPT

## 2020-06-02 PROCEDURE — 84443 ASSAY THYROID STIM HORMONE: CPT

## 2020-06-02 NOTE — LETTER
Deloris 3, 2020         Gera Velez  [address] [facility] [place] [address]      Bill   Thanks for getting labs drawn.  Kidney, liver, thyroid, blood counts, vitamin D all normal.  Cholesterol is elevated.  Please decrease animal fats, fast foods in your diet.  Add more veggies and whole grains.  Use olive oil instead of butter.  Exercise is also a big part of the treatment of cholesterol.  20 minutes of cardiovascular exercise (brisk walking, non-stop) is recommended.     We will want to recheck those labs at our next appointment.  Let me know if you have questions.   NIKHIL Ayala    Resulted Orders   Lipid Profile   Result Value Ref Range    Cholesterol,Tot 222 (H) 100 - 199 mg/dL    Triglycerides 136 0 - 149 mg/dL    HDL 78 >=40 mg/dL     (H) <100 mg/dL    Narrative    Fast 8-10 hours, OK to drink water as needed during fast,  take medications per your provider's instructions.  Request patient fasting?->Yes  Fasting Instructions:->Fast 8-10 hours, OK to drink water as  needed during fast, take medications per your provider's  instruction.   VITAMIN D,25 HYDROXY   Result Value Ref Range    25-Hydroxy   Vitamin D 25 41 30 - 100 ng/mL      Comment:      Adult Ranges:   <20 ng/mL - Deficiency  20-29 ng/mL - Insufficiency   ng/mL - Sufficiency  Effective 3/18/2020, this electrochemiluminescence binding assay is  performed using Roche yfn e immunoassay analyzer.  The Elecsys Vitamin D  total II assay is intended for the quantitative determination of total 25  hydroxyvitamin D in human serum and plasma. This assay is to be used as an  aid in the assessment of vitamin D sufficiency in adults.      Narrative    Fast 8-10 hours, OK to drink water as needed during fast,  take medications per your provider's instructions.  Request patient fasting?->Yes  Fasting Instructions:->Fast 8-10 hours, OK to drink water as  needed during fast, take medications per your provider's  instruction.   FREE  THYROXINE   Result Value Ref Range    Free T-4 1.31 0.93 - 1.70 ng/dL      Comment:      Please note new FT4 reference range effective 4/29/2020.    Narrative    Fast 8-10 hours, OK to drink water as needed during fast,  take medications per your provider's instructions.  Request patient fasting?->Yes  Fasting Instructions:->Fast 8-10 hours, OK to drink water as  needed during fast, take medications per your provider's  instruction.   TSH   Result Value Ref Range    TSH 2.100 0.380 - 5.330 uIU/mL      Comment:      Please note new reference ranges effective 12/14/2017 10:00 AM  Pregnant Females, 1st Trimester  0.050-3.700  Pregnant Females, 2nd Trimester  0.310-4.350  Pregnant Females, 3rd Trimester  0.410-5.180      Narrative    Fast 8-10 hours, OK to drink water as needed during fast,  take medications per your provider's instructions.  Request patient fasting?->Yes  Fasting Instructions:->Fast 8-10 hours, OK to drink water as  needed during fast, take medications per your provider's  instruction.   Comp Metabolic Panel   Result Value Ref Range    Sodium 140 135 - 145 mmol/L    Potassium 4.5 3.6 - 5.5 mmol/L    Chloride 102 96 - 112 mmol/L    Co2 28 20 - 33 mmol/L    Anion Gap 10.0 7.0 - 16.0    Glucose 94 65 - 99 mg/dL    Bun 14 8 - 22 mg/dL    Creatinine 0.82 0.50 - 1.40 mg/dL    Calcium 9.5 8.5 - 10.5 mg/dL    AST(SGOT) 20 12 - 45 U/L    ALT(SGPT) 18 2 - 50 U/L    Alkaline Phosphatase 59 30 - 99 U/L    Total Bilirubin 0.6 0.1 - 1.5 mg/dL    Albumin 4.7 3.2 - 4.9 g/dL    Total Protein 7.0 6.0 - 8.2 g/dL    Globulin 2.3 1.9 - 3.5 g/dL    A-G Ratio 2.0 g/dL    Narrative    Fast 8-10 hours, OK to drink water as needed during fast,  take medications per your provider's instructions.  Request patient fasting?->Yes  Fasting Instructions:->Fast 8-10 hours, OK to drink water as  needed during fast, take medications per your provider's  instruction.   CBC WITH DIFFERENTIAL   Result Value Ref Range    WBC 5.1 4.8 - 10.8  K/uL    RBC 4.75 4.70 - 6.10 M/uL    Hemoglobin 15.0 14.0 - 18.0 g/dL    Hematocrit 46.2 42.0 - 52.0 %    MCV 97.3 81.4 - 97.8 fL    MCH 31.6 27.0 - 33.0 pg    MCHC 32.5 (L) 33.7 - 35.3 g/dL    RDW 41.3 35.9 - 50.0 fL    Platelet Count 222 164 - 446 K/uL    MPV 9.5 9.0 - 12.9 fL    Neutrophils-Polys 55.50 44.00 - 72.00 %    Lymphocytes 35.00 22.00 - 41.00 %    Monocytes 6.50 0.00 - 13.40 %    Eosinophils 1.20 0.00 - 6.90 %    Basophils 1.20 0.00 - 1.80 %    Immature Granulocytes 0.60 0.00 - 0.90 %    Nucleated RBC 0.00 /100 WBC    Neutrophils (Absolute) 2.83 1.82 - 7.42 K/uL      Comment:      Includes immature neutrophils, if present.    Lymphs (Absolute) 1.78 1.00 - 4.80 K/uL    Monos (Absolute) 0.33 0.00 - 0.85 K/uL    Eos (Absolute) 0.06 0.00 - 0.51 K/uL    Baso (Absolute) 0.06 0.00 - 0.12 K/uL    Immature Granulocytes (abs) 0.03 0.00 - 0.11 K/uL    NRBC (Absolute) 0.00 K/uL    Narrative    Fast 8-10 hours, OK to drink water as needed during fast,  take medications per your provider's instructions.  Request patient fasting?->Yes  Fasting Instructions:->Fast 8-10 hours, OK to drink water as  needed during fast, take medications per your provider's  instruction.   FASTING STATUS   Result Value Ref Range    Fasting Status Fasting     Narrative    Fast 8-10 hours, OK to drink water as needed during fast,  take medications per your provider's instructions.  Request patient fasting?->Yes  Fasting Instructions:->Fast 8-10 hours, OK to drink water as  needed during fast, take medications per your provider's  instruction.   ESTIMATED GFR   Result Value Ref Range    GFR If African American >60 >60 mL/min/1.73 m 2    GFR If Non African American >60 >60 mL/min/1.73 m 2    Narrative    Fast 8-10 hours, OK to drink water as needed during fast,  take medications per your provider's instructions.  Request patient fasting?->Yes  Fasting Instructions:->Fast 8-10 hours, OK to drink water as  needed during fast, take medications  per your provider's  instruction.

## 2020-06-02 NOTE — LETTER
Deloris 3, 2020         Gera Velez  [address] [facility] [address]        Dear Gera:      Below are the results from your recent visit:    Resulted Orders   Lipid Profile   Result Value Ref Range    Cholesterol,Tot 222 (H) 100 - 199 mg/dL    Triglycerides 136 0 - 149 mg/dL    HDL 78 >=40 mg/dL     (H) <100 mg/dL    Narrative    Fast 8-10 hours, OK to drink water as needed during fast,  take medications per your provider's instructions.  Request patient fasting?->Yes  Fasting Instructions:->Fast 8-10 hours, OK to drink water as  needed during fast, take medications per your provider's  instruction.   VITAMIN D,25 HYDROXY   Result Value Ref Range    25-Hydroxy   Vitamin D 25 41 30 - 100 ng/mL      Comment:      Adult Ranges:   <20 ng/mL - Deficiency  20-29 ng/mL - Insufficiency   ng/mL - Sufficiency  Effective 3/18/2020, this electrochemiluminescence binding assay is  performed using Roche yfn e immunoassay analyzer.  The Elecsys Vitamin D  total II assay is intended for the quantitative determination of total 25  hydroxyvitamin D in human serum and plasma. This assay is to be used as an  aid in the assessment of vitamin D sufficiency in adults.      Narrative    Fast 8-10 hours, OK to drink water as needed during fast,  take medications per your provider's instructions.  Request patient fasting?->Yes  Fasting Instructions:->Fast 8-10 hours, OK to drink water as  needed during fast, take medications per your provider's  instruction.   FREE THYROXINE   Result Value Ref Range    Free T-4 1.31 0.93 - 1.70 ng/dL      Comment:      Please note new FT4 reference range effective 4/29/2020.    Narrative    Fast 8-10 hours, OK to drink water as needed during fast,  take medications per your provider's instructions.  Request patient fasting?->Yes  Fasting Instructions:->Fast 8-10 hours, OK to drink water as  needed during fast, take medications per your provider's  instruction.   TSH   Result Value Ref  Range    TSH 2.100 0.380 - 5.330 uIU/mL      Comment:      Please note new reference ranges effective 12/14/2017 10:00 AM  Pregnant Females, 1st Trimester  0.050-3.700  Pregnant Females, 2nd Trimester  0.310-4.350  Pregnant Females, 3rd Trimester  0.410-5.180      Narrative    Fast 8-10 hours, OK to drink water as needed during fast,  take medications per your provider's instructions.  Request patient fasting?->Yes  Fasting Instructions:->Fast 8-10 hours, OK to drink water as  needed during fast, take medications per your provider's  instruction.   Comp Metabolic Panel   Result Value Ref Range    Sodium 140 135 - 145 mmol/L    Potassium 4.5 3.6 - 5.5 mmol/L    Chloride 102 96 - 112 mmol/L    Co2 28 20 - 33 mmol/L    Anion Gap 10.0 7.0 - 16.0    Glucose 94 65 - 99 mg/dL    Bun 14 8 - 22 mg/dL    Creatinine 0.82 0.50 - 1.40 mg/dL    Calcium 9.5 8.5 - 10.5 mg/dL    AST(SGOT) 20 12 - 45 U/L    ALT(SGPT) 18 2 - 50 U/L    Alkaline Phosphatase 59 30 - 99 U/L    Total Bilirubin 0.6 0.1 - 1.5 mg/dL    Albumin 4.7 3.2 - 4.9 g/dL    Total Protein 7.0 6.0 - 8.2 g/dL    Globulin 2.3 1.9 - 3.5 g/dL    A-G Ratio 2.0 g/dL    Narrative    Fast 8-10 hours, OK to drink water as needed during fast,  take medications per your provider's instructions.  Request patient fasting?->Yes  Fasting Instructions:->Fast 8-10 hours, OK to drink water as  needed during fast, take medications per your provider's  instruction.   CBC WITH DIFFERENTIAL   Result Value Ref Range    WBC 5.1 4.8 - 10.8 K/uL    RBC 4.75 4.70 - 6.10 M/uL    Hemoglobin 15.0 14.0 - 18.0 g/dL    Hematocrit 46.2 42.0 - 52.0 %    MCV 97.3 81.4 - 97.8 fL    MCH 31.6 27.0 - 33.0 pg    MCHC 32.5 (L) 33.7 - 35.3 g/dL    RDW 41.3 35.9 - 50.0 fL    Platelet Count 222 164 - 446 K/uL    MPV 9.5 9.0 - 12.9 fL    Neutrophils-Polys 55.50 44.00 - 72.00 %    Lymphocytes 35.00 22.00 - 41.00 %    Monocytes 6.50 0.00 - 13.40 %    Eosinophils 1.20 0.00 - 6.90 %    Basophils 1.20 0.00 - 1.80 %     Immature Granulocytes 0.60 0.00 - 0.90 %    Nucleated RBC 0.00 /100 WBC    Neutrophils (Absolute) 2.83 1.82 - 7.42 K/uL      Comment:      Includes immature neutrophils, if present.    Lymphs (Absolute) 1.78 1.00 - 4.80 K/uL    Monos (Absolute) 0.33 0.00 - 0.85 K/uL    Eos (Absolute) 0.06 0.00 - 0.51 K/uL    Baso (Absolute) 0.06 0.00 - 0.12 K/uL    Immature Granulocytes (abs) 0.03 0.00 - 0.11 K/uL    NRBC (Absolute) 0.00 K/uL    Narrative    Fast 8-10 hours, OK to drink water as needed during fast,  take medications per your provider's instructions.  Request patient fasting?->Yes  Fasting Instructions:->Fast 8-10 hours, OK to drink water as  needed during fast, take medications per your provider's  instruction.   FASTING STATUS   Result Value Ref Range    Fasting Status Fasting     Narrative    Fast 8-10 hours, OK to drink water as needed during fast,  take medications per your provider's instructions.  Request patient fasting?->Yes  Fasting Instructions:->Fast 8-10 hours, OK to drink water as  needed during fast, take medications per your provider's  instruction.   ESTIMATED GFR   Result Value Ref Range    GFR If African American >60 >60 mL/min/1.73 m 2    GFR If Non African American >60 >60 mL/min/1.73 m 2    Narrative    Fast 8-10 hours, OK to drink water as needed during fast,  take medications per your provider's instructions.  Request patient fasting?->Yes  Fasting Instructions:->Fast 8-10 hours, OK to drink water as  needed during fast, take medications per your provider's  instruction.     The test results show that ***.    If you have any questions or concerns, please don't hesitate to call.        Sincerely,      [facility]    Electronically Signed

## 2020-06-03 NOTE — ADDENDUM NOTE
Encounter addended by: Willard Del Valle, Med Ass't on: 6/3/2020 9:51 AM   Actions taken: Letter saved

## 2020-06-23 ENCOUNTER — OFFICE VISIT (OUTPATIENT)
Dept: BEHAVIORAL HEALTH | Facility: CLINIC | Age: 50
End: 2020-06-23
Payer: COMMERCIAL

## 2020-06-23 DIAGNOSIS — F41.9 ANXIETY DISORDER, UNSPECIFIED TYPE: ICD-10-CM

## 2020-06-23 DIAGNOSIS — F33.41 RECURRENT MAJOR DEPRESSIVE DISORDER, IN PARTIAL REMISSION (HCC): ICD-10-CM

## 2020-06-23 PROCEDURE — 90791 PSYCH DIAGNOSTIC EVALUATION: CPT | Performed by: PSYCHOLOGIST

## 2020-07-27 ENCOUNTER — TELEMEDICINE (OUTPATIENT)
Dept: BEHAVIORAL HEALTH | Facility: CLINIC | Age: 50
End: 2020-07-27
Payer: COMMERCIAL

## 2020-07-27 DIAGNOSIS — F33.41 RECURRENT MAJOR DEPRESSIVE DISORDER, IN PARTIAL REMISSION (HCC): ICD-10-CM

## 2020-07-27 DIAGNOSIS — Z72.0 TOBACCO ABUSE: ICD-10-CM

## 2020-07-27 PROCEDURE — 90834 PSYTX W PT 45 MINUTES: CPT | Mod: 95,CR | Performed by: PSYCHOLOGIST

## 2020-07-27 NOTE — BH THERAPY
[facility]  Therapy Progress Note  Met with the patient per their request via (HIPPA compliant) Zoom video conference to accommodate state imposed restrictions on social contact due to the COVID-19 pandemic.      Patient Name: Gera Velez  Patient MRN: [id number]  Today's Date: 7/27/2020     Type of session:Individual psychotherapy  Length of session: 38 minutes  Persons in attendance:Patient    Subjective/New Info: The patient ID/Chief Complaint:  The patient is a 50 year old male, , .  The patient self-referred and voluntarily presented for an individual intake to address concerns related to depression and anxiety.  Reviewed limits of confidentiality and [facility] policies; patient expressed understanding and agreed to voluntarily proceed with evaluation and treatment.    Interval History:   Session Focus: The patient's estimated global assessment of functioning indicates a mild level of difficulty with some problems in relationships, work, or school functioning. The patient is nonetheless functioning well and has some significant relationships.  The patient presented a very interesting history suggesting the possibility of the presence of dysthymia however there is an adequate data at the present time.  Encouraged the patient to start the process of self monitoring using a number of different apps.  Also discussed with the patient the benefits of having his partner also complete the self-monitoring.      Therapeutic Intervention: Cognitive Behavioral Therapy      Planned Intervention: Continue to introduce the patient to cognitive behavioral therapy and to review the patient's self-monitoring related to mood in order to move forward with a differential diagnosis of possible dysthymia      Objective/Observations:    Patient did not present in acute distress. Patient was appropriately groomed. Patient was alert and oriented x4. Eye contact was appropriate.  No abnormalities in attention or concentration were noted. No abnormalities of movement present; psychomotor activity was normal. Speech was fluent and regular in rhythm, rate, volume, and tone. Thought processes linear, logical and goal directed. There was no evidence of thought disorder. No auditory or visual hallucinations. Long and short term memory appeared to be intact. Insight, judgment, and impulse control were deemed to be good.  Reported mood was “comfortable.” Affect was full-ranging and appropriate to thought content and conversation.  Patient denied past and current suicidal and homicidal ideation in plan, intent, and preparatory behavior.      Diagnoses:   1. Recurrent major depressive disorder, in partial remission (HCC)     2. Tobacco abuse         The patient denied any suicide-related ideation and/or behaviors and intent/plan, denied thoughts about death and dying both in session and during the period since last appointment, or past 2 weeks.    Risk Level: Not Currently at Clinically Significant Risk  Hospitalization is not deemed necessary at this time as the patient does not present a clear or imminent danger to self or others. No indication for pursuing higher level of care. Outpatient management is currently most appropriate and least restrictive level of care.    Current risk:   SUICIDE: Low   Homicide: Not applicable   Self-harm: Not applicable   Relapse: Not applicable   Other:    Safety Plan reviewed? No   If evidence of imminent risk is present, intervention/plan:     Treatment Goal(s)/Objective(s) addressed:     Anxiety   Goal: Develop strategies to reduce symptoms, or   Reduce anxiety and improve coping skills     Be free of panic episodes (100%)   Recognize and plan for top five anxiety-provoking situations   Learn two new ways of coping with routine stressors   Report feeling more positive about self and abilities during therapy sessions   Develop strategies for thought distraction  when fixating on the future        Progress toward Treatment Goals: No change    Plan:  - Next appointment scheduled:  8/10/2020    1) The patient will return to the clinic 2-3 weeks.  2) Crisis Response Plan:  Reviewed emergency resources with the patient and the patient expressed understanding including:  If feeling suicidal, patient will call or present to the [facility] during duty hours or present to closest ED ([facility] or [facility], call 911 or crisis hotline (8-233-228-YITG) after duty hours.  3) Referrals/Consults:  N/A  4) Barriers to Learning:  No  5) Readiness to Learn:  Yes  6) Cultural Concerns:  No  7) Patient voiced understanding of, and agreement with, plan and goals as annotated above.  8) Declare these services are medically necessary and appropriate to the patient’s diagnosis and needs  9) The point of contact at the [facility] regarding this evaluation is Dr. Sandoval, Psychologist.    Marco Sandoval III, EdGOLDEN  7/27/2020

## 2020-08-04 ENCOUNTER — OFFICE VISIT (OUTPATIENT)
Dept: MEDICAL GROUP | Facility: PHYSICIAN GROUP | Age: 50
End: 2020-08-04
Payer: COMMERCIAL

## 2020-08-04 VITALS
TEMPERATURE: 98.7 F | BODY MASS INDEX: 20.19 KG/M2 | HEART RATE: 68 BPM | HEIGHT: 70 IN | WEIGHT: 141 LBS | DIASTOLIC BLOOD PRESSURE: 60 MMHG | OXYGEN SATURATION: 99 % | RESPIRATION RATE: 16 BRPM | SYSTOLIC BLOOD PRESSURE: 100 MMHG

## 2020-08-04 DIAGNOSIS — E78.00 ELEVATED CHOLESTEROL: ICD-10-CM

## 2020-08-04 DIAGNOSIS — F33.41 RECURRENT MAJOR DEPRESSIVE DISORDER, IN PARTIAL REMISSION (HCC): ICD-10-CM

## 2020-08-04 DIAGNOSIS — Z86.010 HISTORY OF COLON POLYPS: ICD-10-CM

## 2020-08-04 PROBLEM — L03.011 CELLULITIS OF RIGHT INDEX FINGER: Status: RESOLVED | Noted: 2019-06-04 | Resolved: 2020-08-04

## 2020-08-04 PROCEDURE — 99214 OFFICE O/P EST MOD 30 MIN: CPT | Performed by: NURSE PRACTITIONER

## 2020-08-04 ASSESSMENT — FIBROSIS 4 INDEX: FIB4 SCORE: 1.08

## 2020-08-04 NOTE — PROGRESS NOTES
"Chief Complaint   Patient presents with   • Follow-Up   • Depression   • Lab Results       Subjective:   Amanda Blanco is a 50 y.o. male here today for evaluation and management of:    Elevated cholesterol  New problem. Results for AMANDA BLANCO (MRN [id number]) as of 8/4/2020 14:36   Ref. Range 6/2/2020 07:17   Cholesterol,Tot Latest Ref Range: 100 - 199 mg/dL 222 (H)   Triglycerides Latest Ref Range: 0 - 149 mg/dL 136   HDL Latest Ref Range: >=40 mg/dL 78   LDL Latest Ref Range: <100 mg/dL 117 (H)   Working on some dietary measures.  Has cut out fast food.  Discussed exercise.     Recurrent major depressive disorder, in partial remission (HCC)  Working with Dr. Mabry, psychologist.  They are working on anxiety and possible dysthymic disorder.  Denies any suicidal thoughts today.  Is not interested in meds at this time.      History of colon polyps  Recent colonoscopy with multiple polyps.  3 year recall plan.  No bowel changes reported.           Current medicines (including changes today)  No current outpatient medications on file.     No current facility-administered medications for this visit.      He  has a past medical history of Allergy and Depression.    ROS as stated inhpi  No chest pain, no shortness of breath, no abdominal pain       Objective:     /60 (BP Location: Left arm, Patient Position: Sitting)   Pulse 68   Temp 37.1 °C (98.7 °F) (Temporal)   Resp 16   Ht 1.778 m (5' 10\")   Wt 64 kg (141 lb)   SpO2 99%  Body mass index is 20.23 kg/m². stable.   Physical Exam:  Constitutional: Alert, no distress.  Skin: Warm, dry, good turgor,no cyanosis, no rashes in visible areas.  Eye: Equal, round and reactive, conjunctiva clear, lids normal.  Ears: No tenderness, no discharge.  External canals are without any significant edema or erythema.   Gross auditory acuity is intact.  Nose: symmetrical without tenderness, no discharge.  Mouth/Throat: lips without lesion.  Oropharynx clear.    Neck: " Trachea midline, no masses, no obvious thyroid enlargement..Range of motion within normal limits.  Neuro: Cranial nerves 2-12 grossly intact.  No sensory deficit.  Respiratory: Unlabored respiratory effort,  Cardiovascular: Normal S1, S2, no murmur, no edema.  Psych: Alert and oriented x3, normal affect and mood and judgement.        Assessment and Plan:   The following treatment plan was discussed    1. Elevated cholesterol  New problem to me.  Recent labs show elevated total cholesterol and LDL. Discussed diet (fast food) and working to get some daily cardiovascular exercise.  Will recheck in one year.  Monitor and follow.     2. Recurrent major depressive disorder, in partial remission (HCC)  Chronic, ongoing, improved.  Currently seeing therapist.  Feeling better.  Handouts provided.     3. History of colon polyps  New problem.  Historical finding on colonoscopy.  Encouraged fiber.  Monitor and follow.       Followup: Return in about 1 year (around 8/4/2021) for Annual.         Educated in proper administration of medication(s) ordered today including safety, possible SE, risks, benefits, rationale and alternatives to therapy.     Please note that this dictation was created using voice recognition software. I have made every reasonable attempt to correct obvious errors, but I expect that there are errors of grammar and possibly content that I did not discover before finalizing the note.

## 2020-08-04 NOTE — ASSESSMENT & PLAN NOTE
Working with Dr. Mabry, psychologist.  They are working on anxiety and possible dysthymic disorder.  Denies any suicidal thoughts today.  Is not interested in meds at this time.

## 2020-08-04 NOTE — ASSESSMENT & PLAN NOTE
New problem. Results for AMANDA BLANCO (MRN [id number]) as of 8/4/2020 14:36   Ref. Range 6/2/2020 07:17   Cholesterol,Tot Latest Ref Range: 100 - 199 mg/dL 222 (H)   Triglycerides Latest Ref Range: 0 - 149 mg/dL 136   HDL Latest Ref Range: >=40 mg/dL 78   LDL Latest Ref Range: <100 mg/dL 117 (H)   Working on some dietary measures.  Has cut out fast food.  Discussed exercise.

## 2020-08-10 ENCOUNTER — APPOINTMENT (OUTPATIENT)
Dept: BEHAVIORAL HEALTH | Facility: CLINIC | Age: 50
End: 2020-08-10
Payer: COMMERCIAL

## 2020-08-24 ENCOUNTER — APPOINTMENT (OUTPATIENT)
Dept: BEHAVIORAL HEALTH | Facility: CLINIC | Age: 50
End: 2020-08-24
Payer: COMMERCIAL

## 2021-04-22 DIAGNOSIS — Z00.6 RESEARCH STUDY PATIENT: ICD-10-CM

## 2021-04-26 ENCOUNTER — HOSPITAL ENCOUNTER (OUTPATIENT)
Facility: MEDICAL CENTER | Age: 51
End: 2021-04-26
Attending: PATHOLOGY
Payer: COMMERCIAL

## 2021-04-26 DIAGNOSIS — Z00.6 RESEARCH STUDY PATIENT: ICD-10-CM

## 2021-05-06 LAB
ELF SCORE: 8.2
RELATIVE RISK: NORMAL
RISK GROUP: NORMAL
RISK: 3.3 %

## 2021-06-24 ENCOUNTER — OFFICE VISIT (OUTPATIENT)
Dept: MEDICAL GROUP | Facility: PHYSICIAN GROUP | Age: 51
End: 2021-06-24
Payer: COMMERCIAL

## 2021-06-24 VITALS
BODY MASS INDEX: 20.33 KG/M2 | RESPIRATION RATE: 16 BRPM | WEIGHT: 142 LBS | OXYGEN SATURATION: 97 % | HEART RATE: 60 BPM | SYSTOLIC BLOOD PRESSURE: 80 MMHG | TEMPERATURE: 97.9 F | HEIGHT: 70 IN | DIASTOLIC BLOOD PRESSURE: 40 MMHG

## 2021-06-24 DIAGNOSIS — F33.41 RECURRENT MAJOR DEPRESSIVE DISORDER, IN PARTIAL REMISSION (HCC): ICD-10-CM

## 2021-06-24 DIAGNOSIS — Z23 NEED FOR VACCINATION: ICD-10-CM

## 2021-06-24 DIAGNOSIS — Z72.0 TOBACCO ABUSE: ICD-10-CM

## 2021-06-24 DIAGNOSIS — E78.00 ELEVATED CHOLESTEROL: ICD-10-CM

## 2021-06-24 PROCEDURE — 99396 PREV VISIT EST AGE 40-64: CPT | Mod: 25 | Performed by: NURSE PRACTITIONER

## 2021-06-24 PROCEDURE — 90750 HZV VACC RECOMBINANT IM: CPT | Performed by: NURSE PRACTITIONER

## 2021-06-24 PROCEDURE — 90471 IMMUNIZATION ADMIN: CPT | Performed by: NURSE PRACTITIONER

## 2021-06-24 ASSESSMENT — PATIENT HEALTH QUESTIONNAIRE - PHQ9
6. FEELING BAD ABOUT YOURSELF - OR THAT YOU ARE A FAILURE OR HAVE LET YOURSELF OR YOUR FAMILY DOWN: SEVERAL DAYS
7. TROUBLE CONCENTRATING ON THINGS, SUCH AS READING THE NEWSPAPER OR WATCHING TELEVISION: SEVERAL DAYS
4. FEELING TIRED OR HAVING LITTLE ENERGY: SEVERAL DAYS
2. FEELING DOWN, DEPRESSED, IRRITABLE, OR HOPELESS: SEVERAL DAYS
9. THOUGHTS THAT YOU WOULD BE BETTER OFF DEAD, OR OF HURTING YOURSELF: SEVERAL DAYS
SUM OF ALL RESPONSES TO PHQ9 QUESTIONS 1 AND 2: 1
5. POOR APPETITE OR OVEREATING: SEVERAL DAYS
8. MOVING OR SPEAKING SO SLOWLY THAT OTHER PEOPLE COULD HAVE NOTICED. OR THE OPPOSITE, BEING SO FIGETY OR RESTLESS THAT YOU HAVE BEEN MOVING AROUND A LOT MORE THAN USUAL: NOT AT ALL
SUM OF ALL RESPONSES TO PHQ QUESTIONS 1-9: 6
1. LITTLE INTEREST OR PLEASURE IN DOING THINGS: NOT AT ALL
3. TROUBLE FALLING OR STAYING ASLEEP OR SLEEPING TOO MUCH: NOT AT ALL

## 2021-06-24 ASSESSMENT — FIBROSIS 4 INDEX: FIB4 SCORE: 1.1

## 2021-06-24 NOTE — ASSESSMENT & PLAN NOTE
No suicidal plan.  Reporting typical 'funk'.  No longer seeing therapist.  Reports feeling down most days, disappointed with life.  Recent vacation to [place]. + screen today.  States a THC pen is helping him relax at night.

## 2021-06-24 NOTE — PROGRESS NOTES
"Chief Complaint   Patient presents with   • Annual Exam       Subjective:   Gera Velez is a 51 y.o. male here today for annual preventative    Elevated cholesterol  Due for labs.  Has eliminated fast foods.     Recurrent major depressive disorder, in partial remission (HCC)  No suicidal plan.  Reporting typical 'funk'.  No longer seeing therapist.  Reports feeling down most days, disappointed with life.  Recent vacation to [place]. + screen today.  States a THC pen is helping him relax at night.      Tobacco abuse  Using jewel vape pen instead of cigarettes.          Current medicines (including changes today)  No current outpatient medications on file.     No current facility-administered medications for this visit.     He  has a past medical history of Allergy and Depression.    ROS as stated in hpi  No chest pain, no shortness of breath, no abdominal pain       Objective:     BP (!) 80/40 (BP Location: Left arm, Patient Position: Sitting)   Pulse 60   Temp 36.6 °C (97.9 °F) (Temporal)   Resp 16   Ht 1.778 m (5' 10\")   Wt 64.4 kg (142 lb)   SpO2 97%  Body mass index is 20.37 kg/m².   Physical Exam:  Constitutional: Alert, no distress.  Skin: Warm, dry, good turgor,no cyanosis, no rashes in visible areas.  Eye: Equal, round and reactive, conjunctiva clear, lids normal.  Ears: No tenderness, no discharge.  External canals are without any significant edema or erythema.  Tympanic membranes are without any inflammation, no effusion.  Gross auditory acuity is intact.  Nose: symmetrical without tenderness, no discharge.  Mouth/Throat: lips without lesion.  Oropharynx clear.  Throat without erythema, exudates or tonsillar enlargement.  Neck: Trachea midline, no masses, no obvious thyroid enlargement.. No cervical or supraclavicular lymphadenopathy. Range of motion within normal limits.  Neuro: Cranial nerves 2-12 grossly intact.  No sensory deficit.  Respiratory: Unlabored respiratory effort, lungs clear " to auscultation, no wheezes, no ronchi.  Cardiovascular: Normal S1, S2, no murmur, no edema.  Abdomen: Soft, non-tender, no masses, no guarding,  no hepatosplenomegaly.  Psych: Alert and oriented x3, normal affect and mood and judgement.        Assessment and Plan:   The following treatment plan was discussed    1. Elevated cholesterol  Due for labs.  No meds at this time.  Monitor, follow, continue exercise and dietary focus.    - CBC WITH DIFFERENTIAL; Future  - Comp Metabolic Panel; Future  - Lipid Profile; Future  - VITAMIN D,25 HYDROXY; Future    2. Recurrent major depressive disorder, in partial remission (HCC)  Chronic, ongoing.  Active.  Decline meds at this time.  Has stopped therapy due to cost.  Advised to do some research and think about meds.  Discussed Red flags and ER precautions. Monitor closely.     3. Tobacco abuse  Chornic, ongoing, vaping now instead of smoking.  Advised to quit.     4. Need for vaccination  Due for vaccination, no acute illness  I have placed the below orders and discussed them with an approved delegating provider.  The MA is performing the below orders under the direction of Dr. Patel.    - Shingles Vaccine (SHINGRIX)      Followup: No follow-ups on file.         Educated in proper administration of medication(s) ordered today including safety, possible SE, risks, benefits, rationale and alternatives to therapy.     Please note that this dictation was created using voice recognition software. I have made every reasonable attempt to correct obvious errors, but I expect that there are errors of grammar and possibly content that I did not discover before finalizing the note.

## 2021-07-13 ENCOUNTER — HOSPITAL ENCOUNTER (OUTPATIENT)
Dept: LAB | Facility: MEDICAL CENTER | Age: 51
End: 2021-07-13
Attending: NURSE PRACTITIONER
Payer: COMMERCIAL

## 2021-07-13 DIAGNOSIS — E78.00 ELEVATED CHOLESTEROL: ICD-10-CM

## 2021-07-13 LAB
25(OH)D3 SERPL-MCNC: 44 NG/ML (ref 30–100)
ALBUMIN SERPL BCP-MCNC: 4.7 G/DL (ref 3.2–4.9)
ALBUMIN/GLOB SERPL: 1.9 G/DL
ALP SERPL-CCNC: 64 U/L (ref 30–99)
ALT SERPL-CCNC: 24 U/L (ref 2–50)
ANION GAP SERPL CALC-SCNC: 9 MMOL/L (ref 7–16)
AST SERPL-CCNC: 24 U/L (ref 12–45)
BASOPHILS # BLD AUTO: 1.1 % (ref 0–1.8)
BASOPHILS # BLD: 0.06 K/UL (ref 0–0.12)
BILIRUB SERPL-MCNC: 0.7 MG/DL (ref 0.1–1.5)
BUN SERPL-MCNC: 15 MG/DL (ref 8–22)
CALCIUM SERPL-MCNC: 9.5 MG/DL (ref 8.5–10.5)
CHLORIDE SERPL-SCNC: 105 MMOL/L (ref 96–112)
CHOLEST SERPL-MCNC: 219 MG/DL (ref 100–199)
CO2 SERPL-SCNC: 27 MMOL/L (ref 20–33)
CREAT SERPL-MCNC: 0.78 MG/DL (ref 0.5–1.4)
EOSINOPHIL # BLD AUTO: 0.05 K/UL (ref 0–0.51)
EOSINOPHIL NFR BLD: 0.9 % (ref 0–6.9)
ERYTHROCYTE [DISTWIDTH] IN BLOOD BY AUTOMATED COUNT: 45.9 FL (ref 35.9–50)
FASTING STATUS PATIENT QL REPORTED: NORMAL
GLOBULIN SER CALC-MCNC: 2.5 G/DL (ref 1.9–3.5)
GLUCOSE SERPL-MCNC: 105 MG/DL (ref 65–99)
HCT VFR BLD AUTO: 48.8 % (ref 42–52)
HDLC SERPL-MCNC: 91 MG/DL
HGB BLD-MCNC: 15.4 G/DL (ref 14–18)
IMM GRANULOCYTES # BLD AUTO: 0.02 K/UL (ref 0–0.11)
IMM GRANULOCYTES NFR BLD AUTO: 0.4 % (ref 0–0.9)
LDLC SERPL CALC-MCNC: 110 MG/DL
LYMPHOCYTES # BLD AUTO: 1.79 K/UL (ref 1–4.8)
LYMPHOCYTES NFR BLD: 33.6 % (ref 22–41)
MCH RBC QN AUTO: 31.6 PG (ref 27–33)
MCHC RBC AUTO-ENTMCNC: 31.6 G/DL (ref 33.7–35.3)
MCV RBC AUTO: 100.2 FL (ref 81.4–97.8)
MONOCYTES # BLD AUTO: 0.35 K/UL (ref 0–0.85)
MONOCYTES NFR BLD AUTO: 6.6 % (ref 0–13.4)
NEUTROPHILS # BLD AUTO: 3.05 K/UL (ref 1.82–7.42)
NEUTROPHILS NFR BLD: 57.4 % (ref 44–72)
NRBC # BLD AUTO: 0 K/UL
NRBC BLD-RTO: 0 /100 WBC
PLATELET # BLD AUTO: 226 K/UL (ref 164–446)
PMV BLD AUTO: 10 FL (ref 9–12.9)
POTASSIUM SERPL-SCNC: 4.5 MMOL/L (ref 3.6–5.5)
PROT SERPL-MCNC: 7.2 G/DL (ref 6–8.2)
RBC # BLD AUTO: 4.87 M/UL (ref 4.7–6.1)
SODIUM SERPL-SCNC: 141 MMOL/L (ref 135–145)
TRIGL SERPL-MCNC: 92 MG/DL (ref 0–149)
WBC # BLD AUTO: 5.3 K/UL (ref 4.8–10.8)

## 2021-07-13 PROCEDURE — 36415 COLL VENOUS BLD VENIPUNCTURE: CPT

## 2021-07-13 PROCEDURE — 82306 VITAMIN D 25 HYDROXY: CPT

## 2021-07-13 PROCEDURE — 80061 LIPID PANEL: CPT

## 2021-07-13 PROCEDURE — 80053 COMPREHEN METABOLIC PANEL: CPT

## 2021-07-13 PROCEDURE — 85025 COMPLETE CBC W/AUTO DIFF WBC: CPT

## 2021-08-01 ENCOUNTER — PATIENT MESSAGE (OUTPATIENT)
Dept: MEDICAL GROUP | Facility: PHYSICIAN GROUP | Age: 51
End: 2021-08-01

## 2021-08-02 ENCOUNTER — TELEPHONE (OUTPATIENT)
Dept: MEDICAL GROUP | Facility: PHYSICIAN GROUP | Age: 51
End: 2021-08-02

## 2021-08-02 NOTE — TELEPHONE ENCOUNTER
1. Caller Name: Gera Velez                        Call Back Number: [phone] (home)       How would the patient prefer to be contacted with a response: Phone call OK to leave a detailed message    Pt called stating his Visit on 06/24/2021 was not coded correctly as an annual visit nor the labs. Pt has received a bill of $110.00.  Pt has been advised to call billing to start there if any correction are needed billing will contact us.

## 2021-08-09 ENCOUNTER — OFFICE VISIT (OUTPATIENT)
Dept: MEDICAL GROUP | Facility: PHYSICIAN GROUP | Age: 51
End: 2021-08-09
Payer: COMMERCIAL

## 2021-08-09 VITALS
SYSTOLIC BLOOD PRESSURE: 106 MMHG | WEIGHT: 140 LBS | HEART RATE: 63 BPM | RESPIRATION RATE: 16 BRPM | HEIGHT: 70 IN | BODY MASS INDEX: 20.04 KG/M2 | TEMPERATURE: 97.7 F | DIASTOLIC BLOOD PRESSURE: 68 MMHG | OXYGEN SATURATION: 96 %

## 2021-08-09 DIAGNOSIS — R55 SYNCOPE, UNSPECIFIED SYNCOPE TYPE: ICD-10-CM

## 2021-08-09 DIAGNOSIS — R71.8 ELEVATED MCV: ICD-10-CM

## 2021-08-09 DIAGNOSIS — F33.41 RECURRENT MAJOR DEPRESSIVE DISORDER, IN PARTIAL REMISSION (HCC): ICD-10-CM

## 2021-08-09 PROCEDURE — 99213 OFFICE O/P EST LOW 20 MIN: CPT | Performed by: NURSE PRACTITIONER

## 2021-08-09 RX ORDER — CITALOPRAM 20 MG/1
20 TABLET ORAL DAILY
Qty: 30 TABLET | Refills: 1 | Status: SHIPPED | OUTPATIENT
Start: 2021-08-09 | End: 2021-10-26 | Stop reason: SDUPTHER

## 2021-08-09 ASSESSMENT — PATIENT HEALTH QUESTIONNAIRE - PHQ9
SUM OF ALL RESPONSES TO PHQ QUESTIONS 1-9: 16
5. POOR APPETITE OR OVEREATING: 1 - SEVERAL DAYS
CLINICAL INTERPRETATION OF PHQ2 SCORE: 4

## 2021-08-09 ASSESSMENT — FIBROSIS 4 INDEX: FIB4 SCORE: 1.13

## 2021-08-09 NOTE — ASSESSMENT & PLAN NOTE
Reports two episodes of syncope.  One day he had ground level level.  bp 106/68. Denies chest pain shortness of breath.  No heavy alcohol drinking.  Staying hydrated. Had subsequent nose bleeds.

## 2021-08-09 NOTE — PROGRESS NOTES
"Chief Complaint   Patient presents with   • Depression       Subjective:   Gera Velez is a 51 y.o. male here today for evaluation and management of:    Syncope  Reports two episodes of syncope.  One day he had ground level level.  bp 106/68. Denies chest pain shortness of breath.  No heavy alcohol drinking.  Staying hydrated. Had subsequent nose bleeds.      Recurrent major depressive disorder, in partial remission (HCC)  Reports that he has done some research and would like to move forward with citalopram.  He has positive depression, denies suicidal ideation.  Discussed self care activities including diet and exercise.  Will start 20 mg.  Return in 4 weeks.            Current medicines (including changes today)  Current Outpatient Medications   Medication Sig Dispense Refill   • citalopram (CELEXA) 20 MG Tab Take 1 tablet by mouth every day. 30 tablet 1     No current facility-administered medications for this visit.     He  has a past medical history of Allergy and Depression.    ROS as stated in hpi  No chest pain, no shortness of breath, no abdominal pain       Objective:     /68 (BP Location: Left arm, Patient Position: Sitting, BP Cuff Size: Adult)   Pulse 63   Temp 36.5 °C (97.7 °F) (Temporal)   Resp 16   Ht 1.778 m (5' 10\")   Wt 63.5 kg (140 lb)   SpO2 96%  Body mass index is 20.09 kg/m². stable.   Physical Exam:  Constitutional: Alert, no distress.  Skin: Warm, dry, good turgor,no cyanosis, no rashes in visible areas.  Eye: Equal, round and reactive, conjunctiva clear, lids normal.  Neck: Trachea midline, no masses, no obvious thyroid enlargement.. No cervical or supraclavicular lymphadenopathy. Range of motion within normal limits.  Neuro: Cranial nerves 2-12 grossly intact.  No sensory deficit.  Respiratory: Unlabored respiratory effort, lungs clear to auscultation, no wheezes, no ronchi.  Cardiovascular: Normal S1, S2, no murmur, no edema.  Psych: Alert and oriented x3, normal " affect and mood and judgement. + depression score        Assessment and Plan:   The following treatment plan was discussed    1. Syncope, unspecified syncope type  Acute, complex issue.  May be vasovagal, dehydration or possible irregular heart rhythm.  Will have patient monitor, encouraged increasing fluids, decreasing alcohol.  If this were to occur again, ER precautions.  Monitor and follow.     2. Elevated MCV  Acute, found on recent labs.  Discussed folic acid and B12.  Decrease alcohol.  Monitor.     3. Recurrent major depressive disorder, in partial remission (HCC)  Chronic, onging. Worsening.  Start citalopram 20 mg daily.  Return in 4 weeks.  Monitor and follow closely.  Denies suicidal plan or past suicidal attempts.       Followup: Return in about 4 weeks (around 9/6/2021) for antidepressants.         Educated in proper administration of medication(s) ordered today including safety, possible SE, risks, benefits, rationale and alternatives to therapy.     Please note that this dictation was created using voice recognition software. I have made every reasonable attempt to correct obvious errors, but I expect that there are errors of grammar and possibly content that I did not discover before finalizing the note.

## 2021-08-09 NOTE — ASSESSMENT & PLAN NOTE
Reports that he has done some research and would like to move forward with citalopram.  He has positive depression, denies suicidal ideation.  Discussed self care activities including diet and exercise.  Will start 20 mg.  Return in 4 weeks.

## 2021-09-02 ENCOUNTER — OFFICE VISIT (OUTPATIENT)
Dept: MEDICAL GROUP | Facility: PHYSICIAN GROUP | Age: 51
End: 2021-09-02
Payer: COMMERCIAL

## 2021-09-02 VITALS
HEIGHT: 70 IN | DIASTOLIC BLOOD PRESSURE: 48 MMHG | RESPIRATION RATE: 16 BRPM | HEART RATE: 58 BPM | WEIGHT: 137 LBS | TEMPERATURE: 98.1 F | OXYGEN SATURATION: 99 % | SYSTOLIC BLOOD PRESSURE: 90 MMHG | BODY MASS INDEX: 19.61 KG/M2

## 2021-09-02 DIAGNOSIS — F33.41 RECURRENT MAJOR DEPRESSIVE DISORDER, IN PARTIAL REMISSION (HCC): ICD-10-CM

## 2021-09-02 DIAGNOSIS — Z23 NEED FOR VACCINATION: ICD-10-CM

## 2021-09-02 PROCEDURE — 99213 OFFICE O/P EST LOW 20 MIN: CPT | Mod: 25 | Performed by: NURSE PRACTITIONER

## 2021-09-02 PROCEDURE — 90750 HZV VACC RECOMBINANT IM: CPT | Performed by: NURSE PRACTITIONER

## 2021-09-02 PROCEDURE — 90471 IMMUNIZATION ADMIN: CPT | Performed by: NURSE PRACTITIONER

## 2021-09-02 ASSESSMENT — FIBROSIS 4 INDEX: FIB4 SCORE: 1.13

## 2021-09-02 NOTE — PROGRESS NOTES
"Chief Complaint   Patient presents with   • Follow-Up   • Depression   • Medication Management       Subjective:   Gera Velez is a 51 y.o. male here today for evaluation and management of:    Recurrent major depressive disorder, in partial remission (HCC)  Reports some improvements in mood.  Having some appetite decrease noticed the past 2-3 weeks.  Feeling some sleepiness.  Concerned about his weight.  Will cut in half to see if this improves appetite.  Recheck in 2 weeks.          Current medicines (including changes today)  Current Outpatient Medications   Medication Sig Dispense Refill   • citalopram (CELEXA) 20 MG Tab Take 1 tablet by mouth every day. 30 tablet 1     No current facility-administered medications for this visit.     He  has a past medical history of Allergy and Depression.    ROS as stated inhpi  No chest pain, no shortness of breath, no abdominal pain       Objective:     BP (!) 90/48 (BP Location: Left arm, Patient Position: Sitting)   Pulse (!) 58   Temp 36.7 °C (98.1 °F) (Temporal)   Resp 16   Ht 1.778 m (5' 10\")   Wt 62.1 kg (137 lb)   SpO2 99%  Body mass index is 19.66 kg/m². weight is down 3 pounds.   Physical Exam:  Constitutional: Alert, no distress.  Skin: Warm, dry, good turgor,no cyanosis, no rashes in visible areas.  Eye: Equal, round and reactive, conjunctiva clear, lids normal.  Neck: Trachea midline, no masses, no obvious thyroid enlargement.. No cervical or supraclavicular lymphadenopathy. Range of motion within normal limits.  Neuro: Cranial nerves 2-12 grossly intact.  No sensory deficit.  Respiratory: Unlabored respiratory effort,   Psych: Alert and oriented x3, normal affect and mood and judgement.        Assessment and Plan:   The following treatment plan was discussed    1. Recurrent major depressive disorder, in partial remission (HCC)  Chronic, ongoing, improving mood, some side effects of anorexia and sleepiness.  Will decrease dose to 10 mg daily.  " Recheck via my chart in 2 weeks.  Monitor and follow.     2. Need for vaccination  Due for 2nd shingles.  No acute illness  I have placed the below orders and discussed them with an approved delegating provider.  The MA is performing the below orders under the direction of Marshall MD.    - Shingles Vaccine (SHINGRIX)      Followup: Return in about 2 weeks (around 9/16/2021) for my chart side effects from citalopram.         Educated in proper administration of medication(s) ordered today including safety, possible SE, risks, benefits, rationale and alternatives to therapy.     Please note that this dictation was created using voice recognition software. I have made every reasonable attempt to correct obvious errors, but I expect that there are errors of grammar and possibly content that I did not discover before finalizing the note.

## 2021-09-02 NOTE — ASSESSMENT & PLAN NOTE
Reports some improvements in mood.  Having some appetite decrease noticed the past 2-3 weeks.  Feeling some sleepiness.  Concerned about his weight.  Will cut in half to see if this improves appetite.  Recheck in 2 weeks.

## 2021-10-26 ENCOUNTER — OFFICE VISIT (OUTPATIENT)
Dept: MEDICAL GROUP | Facility: PHYSICIAN GROUP | Age: 51
End: 2021-10-26
Payer: COMMERCIAL

## 2021-10-26 VITALS
OXYGEN SATURATION: 99 % | BODY MASS INDEX: 19.61 KG/M2 | SYSTOLIC BLOOD PRESSURE: 94 MMHG | HEIGHT: 70 IN | WEIGHT: 137 LBS | RESPIRATION RATE: 14 BRPM | TEMPERATURE: 98.3 F | DIASTOLIC BLOOD PRESSURE: 58 MMHG | HEART RATE: 61 BPM

## 2021-10-26 DIAGNOSIS — E78.00 ELEVATED CHOLESTEROL: ICD-10-CM

## 2021-10-26 DIAGNOSIS — F33.41 RECURRENT MAJOR DEPRESSIVE DISORDER, IN PARTIAL REMISSION (HCC): ICD-10-CM

## 2021-10-26 DIAGNOSIS — R73.09 ELEVATED GLUCOSE: ICD-10-CM

## 2021-10-26 DIAGNOSIS — R71.8 ELEVATED MCV: ICD-10-CM

## 2021-10-26 DIAGNOSIS — R42 POSTURAL LIGHTHEADEDNESS: ICD-10-CM

## 2021-10-26 DIAGNOSIS — Z23 NEED FOR VACCINATION: ICD-10-CM

## 2021-10-26 PROCEDURE — 90471 IMMUNIZATION ADMIN: CPT | Performed by: STUDENT IN AN ORGANIZED HEALTH CARE EDUCATION/TRAINING PROGRAM

## 2021-10-26 PROCEDURE — 99214 OFFICE O/P EST MOD 30 MIN: CPT | Mod: 25 | Performed by: STUDENT IN AN ORGANIZED HEALTH CARE EDUCATION/TRAINING PROGRAM

## 2021-10-26 PROCEDURE — 90686 IIV4 VACC NO PRSV 0.5 ML IM: CPT | Performed by: STUDENT IN AN ORGANIZED HEALTH CARE EDUCATION/TRAINING PROGRAM

## 2021-10-26 RX ORDER — CITALOPRAM HYDROBROMIDE 10 MG/1
10 TABLET ORAL DAILY
Qty: 90 TABLET | Refills: 0 | Status: SHIPPED | OUTPATIENT
Start: 2021-10-26 | End: 2022-01-05 | Stop reason: SDUPTHER

## 2021-10-26 SDOH — HEALTH STABILITY: MENTAL HEALTH
STRESS IS WHEN SOMEONE FEELS TENSE, NERVOUS, ANXIOUS, OR CAN'T SLEEP AT NIGHT BECAUSE THEIR MIND IS TROUBLED. HOW STRESSED ARE YOU?: ONLY A LITTLE

## 2021-10-26 SDOH — ECONOMIC STABILITY: HOUSING INSECURITY
IN THE LAST 12 MONTHS, WAS THERE A TIME WHEN YOU DID NOT HAVE A STEADY PLACE TO SLEEP OR SLEPT IN A SHELTER (INCLUDING NOW)?: NO

## 2021-10-26 SDOH — ECONOMIC STABILITY: FOOD INSECURITY: WITHIN THE PAST 12 MONTHS, THE FOOD YOU BOUGHT JUST DIDN'T LAST AND YOU DIDN'T HAVE MONEY TO GET MORE.: NEVER TRUE

## 2021-10-26 SDOH — HEALTH STABILITY: PHYSICAL HEALTH: ON AVERAGE, HOW MANY MINUTES DO YOU ENGAGE IN EXERCISE AT THIS LEVEL?: 0 MIN

## 2021-10-26 SDOH — ECONOMIC STABILITY: HOUSING INSECURITY: IN THE LAST 12 MONTHS, HOW MANY PLACES HAVE YOU LIVED?: 1

## 2021-10-26 SDOH — ECONOMIC STABILITY: TRANSPORTATION INSECURITY
IN THE PAST 12 MONTHS, HAS LACK OF TRANSPORTATION KEPT YOU FROM MEETINGS, WORK, OR FROM GETTING THINGS NEEDED FOR DAILY LIVING?: NO

## 2021-10-26 SDOH — ECONOMIC STABILITY: INCOME INSECURITY: IN THE LAST 12 MONTHS, WAS THERE A TIME WHEN YOU WERE NOT ABLE TO PAY THE MORTGAGE OR RENT ON TIME?: NO

## 2021-10-26 SDOH — ECONOMIC STABILITY: FOOD INSECURITY: WITHIN THE PAST 12 MONTHS, YOU WORRIED THAT YOUR FOOD WOULD RUN OUT BEFORE YOU GOT MONEY TO BUY MORE.: NEVER TRUE

## 2021-10-26 SDOH — ECONOMIC STABILITY: INCOME INSECURITY: HOW HARD IS IT FOR YOU TO PAY FOR THE VERY BASICS LIKE FOOD, HOUSING, MEDICAL CARE, AND HEATING?: SOMEWHAT HARD

## 2021-10-26 SDOH — HEALTH STABILITY: PHYSICAL HEALTH: ON AVERAGE, HOW MANY DAYS PER WEEK DO YOU ENGAGE IN MODERATE TO STRENUOUS EXERCISE (LIKE A BRISK WALK)?: 0 DAYS

## 2021-10-26 SDOH — ECONOMIC STABILITY: TRANSPORTATION INSECURITY
IN THE PAST 12 MONTHS, HAS THE LACK OF TRANSPORTATION KEPT YOU FROM MEDICAL APPOINTMENTS OR FROM GETTING MEDICATIONS?: NO

## 2021-10-26 SDOH — ECONOMIC STABILITY: TRANSPORTATION INSECURITY
IN THE PAST 12 MONTHS, HAS LACK OF RELIABLE TRANSPORTATION KEPT YOU FROM MEDICAL APPOINTMENTS, MEETINGS, WORK OR FROM GETTING THINGS NEEDED FOR DAILY LIVING?: NO

## 2021-10-26 ASSESSMENT — LIFESTYLE VARIABLES
HOW OFTEN DO YOU HAVE A DRINK CONTAINING ALCOHOL: 4 OR MORE TIMES A WEEK
HOW MANY STANDARD DRINKS CONTAINING ALCOHOL DO YOU HAVE ON A TYPICAL DAY: 1 OR 2
HOW OFTEN DO YOU HAVE SIX OR MORE DRINKS ON ONE OCCASION: WEEKLY

## 2021-10-26 ASSESSMENT — SOCIAL DETERMINANTS OF HEALTH (SDOH)
HOW OFTEN DO YOU GET TOGETHER WITH FRIENDS OR RELATIVES?: ONCE A WEEK
WITHIN THE PAST 12 MONTHS, YOU WORRIED THAT YOUR FOOD WOULD RUN OUT BEFORE YOU GOT THE MONEY TO BUY MORE: NEVER TRUE
HOW MANY DRINKS CONTAINING ALCOHOL DO YOU HAVE ON A TYPICAL DAY WHEN YOU ARE DRINKING: 1 OR 2
HOW OFTEN DO YOU HAVE A DRINK CONTAINING ALCOHOL: 4 OR MORE TIMES A WEEK
HOW OFTEN DO YOU HAVE SIX OR MORE DRINKS ON ONE OCCASION: WEEKLY
HOW OFTEN DO YOU ATTENT MEETINGS OF THE CLUB OR ORGANIZATION YOU BELONG TO?: NEVER
HOW HARD IS IT FOR YOU TO PAY FOR THE VERY BASICS LIKE FOOD, HOUSING, MEDICAL CARE, AND HEATING?: SOMEWHAT HARD
HOW OFTEN DO YOU GET TOGETHER WITH FRIENDS OR RELATIVES?: ONCE A WEEK
HOW OFTEN DO YOU ATTEND CHURCH OR RELIGIOUS SERVICES?: NEVER
DO YOU BELONG TO ANY CLUBS OR ORGANIZATIONS SUCH AS CHURCH GROUPS UNIONS, FRATERNAL OR ATHLETIC GROUPS, OR SCHOOL GROUPS?: NO
IN A TYPICAL WEEK, HOW MANY TIMES DO YOU TALK ON THE PHONE WITH FAMILY, FRIENDS, OR NEIGHBORS?: TWICE A WEEK
IN A TYPICAL WEEK, HOW MANY TIMES DO YOU TALK ON THE PHONE WITH FAMILY, FRIENDS, OR NEIGHBORS?: TWICE A WEEK
DO YOU BELONG TO ANY CLUBS OR ORGANIZATIONS SUCH AS CHURCH GROUPS UNIONS, FRATERNAL OR ATHLETIC GROUPS, OR SCHOOL GROUPS?: NO
HOW OFTEN DO YOU ATTEND CHURCH OR RELIGIOUS SERVICES?: NEVER
HOW OFTEN DO YOU ATTENT MEETINGS OF THE CLUB OR ORGANIZATION YOU BELONG TO?: NEVER

## 2021-10-26 ASSESSMENT — FIBROSIS 4 INDEX: FIB4 SCORE: 1.13

## 2021-10-26 NOTE — PROGRESS NOTES
New to Provider  (and [facility])      Gera Velez is a 51 y.o. male.    Reason for visit: PCP switch  / Establish.     - Prior PCP:  GERRY Ayala, ([facility] med)     (will be leaving clinic soon).     Chief Complaint   Patient presents with   • Establish Care             Problems discussed this visit:    This note uses problem-based documentation.  Subjective HPI is included in Assessment & Plan, at bottom of note.   Problem   Elevated Glucose   Postural Lightheadedness   Elevated Mcv   Elevated Cholesterol   Recurrent Major Depressive Disorder, in Partial Remission (Hcc)                          Past Medical History:   Diagnosis Date   • Allergy     seasonal   • Depression        History reviewed. No pertinent surgical history.       Family History   Problem Relation Age of Onset   • Diabetes Mother    • Cancer Mother         brain   • Cancer Father         colon       Social History     Tobacco Use   • Smoking status: Former Smoker     Packs/day: 0.50     Years: 33.00     Pack years: 16.50     Types: Cigarettes   • Smokeless tobacco: Never Used   • Tobacco comment: ex smoker, but now vaping... will try to reduce   Substance Use Topics   • Alcohol use: Yes     Comment: 1 per day       Current medications:  (including new changes):  Current Outpatient Medications   Medication Sig Dispense Refill   • citalopram (CELEXA) 10 MG tablet Take 1 Tablet by mouth every day. 90 Tablet 0     No current facility-administered medications for this visit.       Allergies as of 10/26/2021 - Reviewed 10/26/2021   Allergen Reaction Noted   • Pcn [penicillins] Unspecified 05/01/2018                    Review of Symptoms  (as noted elsewhere, and .....)    GEN/CONST:   Denies fever, chills, fatigue,    CARDIO:   Denies chest pain, palpitations, or edema.    RESP:   Denies shortness of breath, wheezing, or coughing.  GI:    Denies nausea, vomiting, diarrhea,     :   Denies dysuria, hematuria,     MSK:   "Denies joint pains  or muscle aches.    NEURO:  Denies numbness or focal weakness.       PSYCH:  Denies current anxiety, depression,        Physical Exam  BP (!) 94/58 (BP Location: Left arm, Patient Position: Sitting, BP Cuff Size: Adult)   Pulse 61   Temp 36.8 °C (98.3 °F) (Temporal)   Resp 14   Ht 1.778 m (5' 10\")   Wt 62.1 kg (137 lb)   SpO2 99%   BMI 19.66 kg/m²   General:  Alert and oriented, No apparent distress.  Neck: Supple. No lymphadenopathy noted. Thyroid not enlarged.   Lungs: Clear to auscultation bilaterally.  No wheezes or rales.     Cardiovascular: Regular rate and rhythm.  No murmurs, or gallops.  Abdomen:  Soft.  Non-distended.  Non-tender.     Extremities: No leg edema.  Good general motion of extremities.    Psychological: Appears to have normal mood and affect.      Labs:  Recent / Prior labs reviewed.     CBC, CMP, Lipids and Vit.D                             Assessment & Plan  (with subjective history and orders):  Of note, the list below is not in a specific nor sortable order.      Problem List Items Addressed This Visit     Elevated cholesterol     Chronic and ongoing.   LDL - 110,  ascvd  1.5%   Denies:  angina, palpitations, or dyspnea   -Advised on diet and exercise.   -No meds, for now.          Elevated glucose     Recent lab with glucose-105.  Denies:  polyphagia, polydipsia, polyuria.   - get new A1c on future labs.          Relevant Orders    HEMOGLOBIN A1C    Elevated MCV     Recent labs noted MCV - 100.2  Denies regular alcohol use.   - will get B12/folate labs.          Relevant Orders    VITAMIN B12    FOLATE    Postural lightheadedness     Notes hx of prior PREsyncope / lightheadedness  When standing quickly (likely orthostatic changes)  Has never passed-out or had full syncope.   Last episode several months ago (~7/2020).   -No medication changes, for now.   -listed for reference.          Recurrent major depressive disorder, in partial remission (HCC)     Had " "previously had chronic depression and had been reluctant to use medication before, due to a past addition (long ago), but started about early 8/2021.  Started on Celexa 20, and felt \"high\" from it, and too euphoric, so he was concerned about this.  So he decreased to Half a dose, and then felt better.   Currently, he feels well, and feels that the depression is well controlled, without any side effects.   - will refill with new Rx for adjusted dose:  - citalopram 10 mg, daily.   - follow-up in 2 months, for recheck.         Relevant Medications    citalopram (CELEXA) 10 MG tablet      Other Visit Diagnoses     Need for vaccination        Relevant Orders    INFLUENZA VACCINE QUAD INJ (PF) (Completed)        Of note, the above list is not in a specific nor sortable order.                  Diagnosis Table, with orders:  1. Recurrent major depressive disorder, in partial remission (HCC)  citalopram (CELEXA) 10 MG tablet   2. Elevated MCV  VITAMIN B12    FOLATE   3. Elevated glucose  HEMOGLOBIN A1C   4. Elevated cholesterol     5. Postural lightheadedness     6. Need for vaccination  INFLUENZA VACCINE QUAD INJ (PF)                 Follow-up:   10 weeks (MDD recheck on med)               A computerized dictation system may have been used in this note.    Despite review, there may be some errors in spelling or grammar.    Sina Castillo M.D.  10/26/2021         "

## 2021-10-26 NOTE — ASSESSMENT & PLAN NOTE
Notes hx of prior PREsyncope / lightheadedness  When standing quickly (likely orthostatic changes)  Has never passed-out or had full syncope.   Last episode several months ago (~7/2020).   -No medication changes, for now.   -listed for reference.

## 2021-10-26 NOTE — ASSESSMENT & PLAN NOTE
"Had previously had chronic depression and had been reluctant to use medication before, due to a past addition (long ago), but started about early 8/2021.  Started on Celexa 20, and felt \"high\" from it, and too euphoric, so he was concerned about this.  So he decreased to Half a dose, and then felt better.   Currently, he feels well, and feels that the depression is well controlled, without any side effects.   - will refill with new Rx for adjusted dose:  - citalopram 10 mg, daily.   - follow-up in 2 months, for recheck.  "

## 2021-10-26 NOTE — ASSESSMENT & PLAN NOTE
Chronic and ongoing.   LDL - 110,  ascvd  1.5%   Denies:  angina, palpitations, or dyspnea   -Advised on diet and exercise.   -No meds, for now.

## 2021-10-26 NOTE — PATIENT INSTRUCTIONS
Please get the labs done in the next few days.  Please get them done while fasting (no food, coffee, or juices, for 8 hours - but water is ok).     Please return in 2 months.   Thank you.

## 2021-10-26 NOTE — ASSESSMENT & PLAN NOTE
Recent lab with glucose-105.  Denies:  polyphagia, polydipsia, polyuria.   - get new A1c on future labs.

## 2021-11-04 ENCOUNTER — HOSPITAL ENCOUNTER (OUTPATIENT)
Dept: LAB | Facility: MEDICAL CENTER | Age: 51
End: 2021-11-04
Attending: STUDENT IN AN ORGANIZED HEALTH CARE EDUCATION/TRAINING PROGRAM
Payer: COMMERCIAL

## 2021-11-04 DIAGNOSIS — R73.09 ELEVATED GLUCOSE: ICD-10-CM

## 2021-11-04 DIAGNOSIS — R71.8 ELEVATED MCV: ICD-10-CM

## 2021-11-04 LAB
EST. AVERAGE GLUCOSE BLD GHB EST-MCNC: 100 MG/DL
FOLATE SERPL-MCNC: 24.2 NG/ML
HBA1C MFR BLD: 5.1 % (ref 4–5.6)
VIT B12 SERPL-MCNC: 447 PG/ML (ref 211–911)

## 2021-11-04 PROCEDURE — 83036 HEMOGLOBIN GLYCOSYLATED A1C: CPT

## 2021-11-04 PROCEDURE — 36415 COLL VENOUS BLD VENIPUNCTURE: CPT

## 2021-11-04 PROCEDURE — 82607 VITAMIN B-12: CPT

## 2021-11-04 PROCEDURE — 82746 ASSAY OF FOLIC ACID SERUM: CPT

## 2021-11-30 ENCOUNTER — APPOINTMENT (RX ONLY)
Dept: URBAN - METROPOLITAN AREA CLINIC 22 | Facility: CLINIC | Age: 51
Setting detail: DERMATOLOGY
End: 2021-11-30

## 2021-11-30 DIAGNOSIS — L81.4 OTHER MELANIN HYPERPIGMENTATION: ICD-10-CM

## 2021-11-30 DIAGNOSIS — L82.1 OTHER SEBORRHEIC KERATOSIS: ICD-10-CM

## 2021-11-30 DIAGNOSIS — D22 MELANOCYTIC NEVI: ICD-10-CM

## 2021-11-30 DIAGNOSIS — L57.0 ACTINIC KERATOSIS: ICD-10-CM

## 2021-11-30 DIAGNOSIS — F17.200 NICOTINE DEPENDENCE, UNSPECIFIED, UNCOMPLICATED: ICD-10-CM

## 2021-11-30 DIAGNOSIS — Z71.89 OTHER SPECIFIED COUNSELING: ICD-10-CM

## 2021-11-30 DIAGNOSIS — L82.0 INFLAMED SEBORRHEIC KERATOSIS: ICD-10-CM

## 2021-11-30 DIAGNOSIS — D18.0 HEMANGIOMA: ICD-10-CM

## 2021-11-30 PROBLEM — D48.5 NEOPLASM OF UNCERTAIN BEHAVIOR OF SKIN: Status: ACTIVE | Noted: 2021-11-30

## 2021-11-30 PROBLEM — D18.01 HEMANGIOMA OF SKIN AND SUBCUTANEOUS TISSUE: Status: ACTIVE | Noted: 2021-11-30

## 2021-11-30 PROBLEM — D22.5 MELANOCYTIC NEVI OF TRUNK: Status: ACTIVE | Noted: 2021-11-30

## 2021-11-30 PROCEDURE — 17003 DESTRUCT PREMALG LES 2-14: CPT | Mod: 59

## 2021-11-30 PROCEDURE — ? PHOTO-DOCUMENTATION

## 2021-11-30 PROCEDURE — ? COUNSELING

## 2021-11-30 PROCEDURE — ? SUNSCREEN RECOMMENDATIONS

## 2021-11-30 PROCEDURE — 11102 TANGNTL BX SKIN SINGLE LES: CPT | Mod: 59

## 2021-11-30 PROCEDURE — 17000 DESTRUCT PREMALG LESION: CPT | Mod: 59

## 2021-11-30 PROCEDURE — 17110 DESTRUCTION B9 LES UP TO 14: CPT

## 2021-11-30 PROCEDURE — 99203 OFFICE O/P NEW LOW 30 MIN: CPT | Mod: 25

## 2021-11-30 PROCEDURE — ? BIOPSY BY SHAVE METHOD

## 2021-11-30 PROCEDURE — ? LIQUID NITROGEN

## 2021-11-30 ASSESSMENT — LOCATION DETAILED DESCRIPTION DERM
LOCATION DETAILED: EPIGASTRIC SKIN
LOCATION DETAILED: LEFT CENTRAL EYEBROW
LOCATION DETAILED: LEFT SUPERIOR MEDIAL UPPER BACK
LOCATION DETAILED: RIGHT SUPERIOR MEDIAL MIDBACK
LOCATION DETAILED: LEFT SUPERIOR FOREHEAD
LOCATION DETAILED: NASAL DORSUM
LOCATION DETAILED: LEFT CENTRAL PARIETAL SCALP

## 2021-11-30 ASSESSMENT — LOCATION ZONE DERM
LOCATION ZONE: FACE
LOCATION ZONE: TRUNK
LOCATION ZONE: NOSE
LOCATION ZONE: SCALP

## 2021-11-30 ASSESSMENT — LOCATION SIMPLE DESCRIPTION DERM
LOCATION SIMPLE: ABDOMEN
LOCATION SIMPLE: NOSE
LOCATION SIMPLE: LEFT FOREHEAD
LOCATION SIMPLE: LEFT UPPER BACK
LOCATION SIMPLE: SCALP
LOCATION SIMPLE: LEFT EYEBROW
LOCATION SIMPLE: RIGHT LOWER BACK

## 2021-11-30 NOTE — PROCEDURE: LIQUID NITROGEN
Consent: The patient's consent was obtained including but not limited to risks of crusting, scabbing, blistering, scarring, darker or lighter pigmentary change, recurrence, incomplete removal and infection.
Number Of Freeze-Thaw Cycles: 2 freeze-thaw cycles
Render Post-Care Instructions In Note?: no
Post-Care Instructions: I reviewed with the patient in detail post-care instructions. Patient is to wear sunprotection, and avoid picking at any of the treated lesions. Pt may apply Vaseline to crusted or scabbing areas.
Detail Level: Detailed
Duration Of Freeze Thaw-Cycle (Seconds): 3
Show Applicator Variable?: Yes
Medical Necessity Information: It is in your best interest to select a reason for this procedure from the list below. All of these items fulfill various CMS LCD requirements except the new and changing color options.
Medical Necessity Clause: This procedure was medically necessary because the lesions that were treated were:
Number Of Freeze-Thaw Cycles: 3 freeze-thaw cycles

## 2022-01-05 ENCOUNTER — OFFICE VISIT (OUTPATIENT)
Dept: MEDICAL GROUP | Facility: PHYSICIAN GROUP | Age: 52
End: 2022-01-05
Payer: COMMERCIAL

## 2022-01-05 VITALS
BODY MASS INDEX: 20.33 KG/M2 | TEMPERATURE: 97.7 F | HEART RATE: 55 BPM | DIASTOLIC BLOOD PRESSURE: 60 MMHG | RESPIRATION RATE: 12 BRPM | WEIGHT: 142 LBS | HEIGHT: 70 IN | OXYGEN SATURATION: 96 % | SYSTOLIC BLOOD PRESSURE: 100 MMHG

## 2022-01-05 DIAGNOSIS — F33.41 RECURRENT MAJOR DEPRESSIVE DISORDER, IN PARTIAL REMISSION (HCC): ICD-10-CM

## 2022-01-05 DIAGNOSIS — R71.8 ELEVATED MCV: ICD-10-CM

## 2022-01-05 DIAGNOSIS — R73.09 ELEVATED GLUCOSE: ICD-10-CM

## 2022-01-05 DIAGNOSIS — E78.00 ELEVATED CHOLESTEROL: ICD-10-CM

## 2022-01-05 PROCEDURE — 99214 OFFICE O/P EST MOD 30 MIN: CPT | Performed by: STUDENT IN AN ORGANIZED HEALTH CARE EDUCATION/TRAINING PROGRAM

## 2022-01-05 RX ORDER — CITALOPRAM HYDROBROMIDE 10 MG/1
10 TABLET ORAL DAILY
Qty: 90 TABLET | Refills: 4 | Status: SHIPPED | OUTPATIENT
Start: 2022-01-05 | End: 2023-02-13 | Stop reason: SDUPTHER

## 2022-01-05 ASSESSMENT — PATIENT HEALTH QUESTIONNAIRE - PHQ9: CLINICAL INTERPRETATION OF PHQ2 SCORE: 0

## 2022-01-05 ASSESSMENT — FIBROSIS 4 INDEX: FIB4 SCORE: 1.13

## 2022-01-05 NOTE — ASSESSMENT & PLAN NOTE
Prior labs noted MCV - 100.2  Denies regular alcohol use.   Subsequent B12/folate labs normal.  - Will get routine /annual labs / CBC,    for next visit in 6 months  - can include MMA and homocysteine, as well.

## 2022-01-05 NOTE — PATIENT INSTRUCTIONS
Please continue on the same medication.    Please get the labs in about 6 months.  Please get the labs done at least a week prior to your next visit.    Please get them done while fasting   (no food, coffee, or juices, for 8 hours - but water is ok).    Thank you.

## 2022-01-05 NOTE — ASSESSMENT & PLAN NOTE
Prior lab with glucose-105.  Denies:  polyphagia, polydipsia, polyuria.   Subsequent A1c-5.1.  Unclear if one was error, or if has IFG.   - get new A1c (and glucose) with next labs, in 6 months.

## 2022-01-05 NOTE — ASSESSMENT & PLAN NOTE
"Had previously had chronic depression and had been reluctant to use medication before, due to a past addition (long ago), but started about early 8/2021.  Started on Celexa 20, and felt \"high\" from it, and too euphoric, so he was concerned about this.  So he decreased to Half a dose, and then felt better.   Currently, he feels well, and feels that the depression is well controlled, without any side effects.   Today, PHQ-2 = 0  (but if all 9 done, score is only 3).  - continue citalopram 10 mg, daily.   - follow-up in 6 month.   "

## 2022-01-05 NOTE — PROGRESS NOTES
Established Patient     Gera Velez is a 51 y.o. male.    Chief Complaint   Patient presents with   • Depression     2 months follow up after decrease of med             Problems addressed this visit:     This note uses problem-based documentation.  Subjective HPI is included in Assessment & Plan, at bottom of note.   Problem   Elevated Glucose   Elevated Mcv   Recurrent Major Depressive Disorder, in Partial Remission (Hcc)                           Past Medical History:   Diagnosis Date   • Allergy     seasonal   • Depression        Patient Active Problem List   Diagnosis   • Tobacco abuse   • Recurrent major depressive disorder, in partial remission (HCC)   • Elevated cholesterol   • History of colon polyps   • Postural lightheadedness   • Elevated MCV   • Elevated glucose       History reviewed. No pertinent surgical history.      Family History   Problem Relation Age of Onset   • Diabetes Mother    • Cancer Mother         brain   • Cancer Father         colon       Social History     Tobacco Use   • Smoking status: Former Smoker     Packs/day: 0.50     Years: 33.00     Pack years: 16.50     Types: Cigarettes   • Smokeless tobacco: Never Used   • Tobacco comment: ex smoker, but now vaping... will try to reduce   Substance Use Topics   • Alcohol use: Yes     Comment: 1 per day       Current medications:  (including new changes):  Current Outpatient Medications   Medication Sig Dispense Refill   • citalopram (CELEXA) 10 MG tablet Take 1 Tablet by mouth every day. 90 Tablet 4     No current facility-administered medications for this visit.       Allergies as of 01/05/2022 - Reviewed 01/05/2022   Allergen Reaction Noted   • Pcn [penicillins] Unspecified 05/01/2018                   Review of Symptoms   (as noted elsewhere, and .....)   GEN/CONST:   Denies fever, chills,    CARDIO:   Denies chest pain, or edema.    RESP:   Denies shortness of breath, or coughing.  GI:    Denies nausea, vomiting, diarrhea,     "  PSYCH:  Denies anxiety, depression,           Physical Exam  /60   Pulse (!) 55   Temp 36.5 °C (97.7 °F) (Temporal)   Resp 12   Ht 1.778 m (5' 10\")   Wt 64.4 kg (142 lb)   SpO2 96%   BMI 20.37 kg/m²   General:  Alert and oriented, No apparent distress.    Lungs: Clear to auscultation bilaterally.  No wheezes or rales.  Cardiovascular: Regular rate and rhythm.  No murmurs, rubs or gallops.  Abdomen:  Soft.  Non-tender.  No rebound or guarding.   Extremities:  No leg edema.  Good general motion of extremities.   Psychological: Appears to have normal mood and affect.        Labs:  Recent labs reviewed. - A1c and B12/folate   Last-Prior labs reviewed - CBC, CMP, Lipids.                            Assessment & Plan  (with subjective history and orders):  Of note, the list below is not in a specific order.      Problem List Items Addressed This Visit         Recurrent major depressive disorder, in partial remission (HCC)     Had previously had chronic depression and had been reluctant to use medication before, due to a past addition (long ago), but started about early 8/2021.  Started on Celexa 20, and felt \"high\" from it, and too euphoric, so he was concerned about this.  So he decreased to Half a dose, and then felt better.   Currently, he feels well, and feels that the depression is well controlled, without any side effects.   Today, PHQ-2 = 0  (but if all 9 done, score is only 3).  - continue citalopram 10 mg, daily.   - follow-up in 6 month.          Relevant Medications    citalopram (CELEXA) 10 MG tablet    Other Relevant Orders    Comp Metabolic Panel    TSH WITH REFLEX TO FT4      Elevated glucose     Prior lab with glucose-105.  Denies:  polyphagia, polydipsia, polyuria.   Subsequent A1c-5.1.  Unclear if one was error, or if has IFG.   - get new A1c (and glucose) with next labs, in 6 months.          Relevant Orders    Comp Metabolic Panel    HEMOGLOBIN A1C    Elevated MCV     Prior labs noted MCV " - 100.2  Denies regular alcohol use.   Subsequent B12/folate labs normal.  - Will get routine /annual labs / CBC,    for next visit in 6 months  - can include MMA and homocysteine, as well.          Relevant Orders    CBC WITH DIFFERENTIAL    HOMOCYSTEINE    METHYLMALONIC ACID, SERUM      Elevated cholesterol    Relevant Orders    Lipid Profile    TSH WITH REFLEX TO FT4     Of note, the above list is not in a specific nor sortable order.                  Diagnosis Table, with orders:  1. Recurrent major depressive disorder, in partial remission (HCC)  citalopram (CELEXA) 10 MG tablet    Comp Metabolic Panel    TSH WITH REFLEX TO FT4   2. Elevated glucose  Comp Metabolic Panel    HEMOGLOBIN A1C   3. Elevated cholesterol  Lipid Profile    TSH WITH REFLEX TO FT4   4. Elevated MCV  CBC WITH DIFFERENTIAL    HOMOCYSTEINE    METHYLMALONIC ACID, SERUM                 Follow-up:  6 months (general labs/screenings, and depression check)              A computerized dictation system may have been used in this note.    Despite review, there may be some errors in spelling or grammar.    Sina Castillo M.D.  1/5/2022

## 2022-05-24 ENCOUNTER — HOSPITAL ENCOUNTER (OUTPATIENT)
Dept: LAB | Facility: MEDICAL CENTER | Age: 52
End: 2022-05-24
Attending: STUDENT IN AN ORGANIZED HEALTH CARE EDUCATION/TRAINING PROGRAM
Payer: COMMERCIAL

## 2022-05-24 DIAGNOSIS — E78.00 ELEVATED CHOLESTEROL: ICD-10-CM

## 2022-05-24 DIAGNOSIS — F33.41 RECURRENT MAJOR DEPRESSIVE DISORDER, IN PARTIAL REMISSION (HCC): ICD-10-CM

## 2022-05-24 DIAGNOSIS — R73.09 ELEVATED GLUCOSE: ICD-10-CM

## 2022-05-24 DIAGNOSIS — R71.8 ELEVATED MCV: ICD-10-CM

## 2022-05-24 LAB
ALBUMIN SERPL BCP-MCNC: 5 G/DL (ref 3.2–4.9)
ALBUMIN/GLOB SERPL: 2.4 G/DL
ALP SERPL-CCNC: 66 U/L (ref 30–99)
ALT SERPL-CCNC: 20 U/L (ref 2–50)
ANION GAP SERPL CALC-SCNC: 12 MMOL/L (ref 7–16)
AST SERPL-CCNC: 27 U/L (ref 12–45)
BASOPHILS # BLD AUTO: 0.5 % (ref 0–1.8)
BASOPHILS # BLD: 0.03 K/UL (ref 0–0.12)
BILIRUB SERPL-MCNC: 0.6 MG/DL (ref 0.1–1.5)
BUN SERPL-MCNC: 15 MG/DL (ref 8–22)
CALCIUM SERPL-MCNC: 9.6 MG/DL (ref 8.5–10.5)
CHLORIDE SERPL-SCNC: 104 MMOL/L (ref 96–112)
CHOLEST SERPL-MCNC: 221 MG/DL (ref 100–199)
CO2 SERPL-SCNC: 26 MMOL/L (ref 20–33)
CREAT SERPL-MCNC: 0.76 MG/DL (ref 0.5–1.4)
EOSINOPHIL # BLD AUTO: 0.06 K/UL (ref 0–0.51)
EOSINOPHIL NFR BLD: 1 % (ref 0–6.9)
ERYTHROCYTE [DISTWIDTH] IN BLOOD BY AUTOMATED COUNT: 42.2 FL (ref 35.9–50)
EST. AVERAGE GLUCOSE BLD GHB EST-MCNC: 103 MG/DL
GFR SERPLBLD CREATININE-BSD FMLA CKD-EPI: 108 ML/MIN/1.73 M 2
GLOBULIN SER CALC-MCNC: 2.1 G/DL (ref 1.9–3.5)
GLUCOSE SERPL-MCNC: 90 MG/DL (ref 65–99)
HBA1C MFR BLD: 5.2 % (ref 4–5.6)
HCT VFR BLD AUTO: 43.4 % (ref 42–52)
HCYS SERPL-SCNC: 9.12 UMOL/L
HDLC SERPL-MCNC: 89 MG/DL
HGB BLD-MCNC: 14.5 G/DL (ref 14–18)
IMM GRANULOCYTES # BLD AUTO: 0.02 K/UL (ref 0–0.11)
IMM GRANULOCYTES NFR BLD AUTO: 0.3 % (ref 0–0.9)
LDLC SERPL CALC-MCNC: 120 MG/DL
LYMPHOCYTES # BLD AUTO: 2.21 K/UL (ref 1–4.8)
LYMPHOCYTES NFR BLD: 36.4 % (ref 22–41)
MCH RBC QN AUTO: 32 PG (ref 27–33)
MCHC RBC AUTO-ENTMCNC: 33.4 G/DL (ref 33.7–35.3)
MCV RBC AUTO: 95.8 FL (ref 81.4–97.8)
MONOCYTES # BLD AUTO: 0.54 K/UL (ref 0–0.85)
MONOCYTES NFR BLD AUTO: 8.9 % (ref 0–13.4)
NEUTROPHILS # BLD AUTO: 3.21 K/UL (ref 1.82–7.42)
NEUTROPHILS NFR BLD: 52.9 % (ref 44–72)
NRBC # BLD AUTO: 0 K/UL
NRBC BLD-RTO: 0 /100 WBC
PLATELET # BLD AUTO: 232 K/UL (ref 164–446)
PMV BLD AUTO: 9.8 FL (ref 9–12.9)
POTASSIUM SERPL-SCNC: 4.8 MMOL/L (ref 3.6–5.5)
PROT SERPL-MCNC: 7.1 G/DL (ref 6–8.2)
RBC # BLD AUTO: 4.53 M/UL (ref 4.7–6.1)
SODIUM SERPL-SCNC: 142 MMOL/L (ref 135–145)
TRIGL SERPL-MCNC: 59 MG/DL (ref 0–149)
TSH SERPL DL<=0.005 MIU/L-ACNC: 1.44 UIU/ML (ref 0.38–5.33)
WBC # BLD AUTO: 6.1 K/UL (ref 4.8–10.8)

## 2022-05-24 PROCEDURE — 80061 LIPID PANEL: CPT

## 2022-05-24 PROCEDURE — 83036 HEMOGLOBIN GLYCOSYLATED A1C: CPT

## 2022-05-24 PROCEDURE — 84443 ASSAY THYROID STIM HORMONE: CPT

## 2022-05-24 PROCEDURE — 83090 ASSAY OF HOMOCYSTEINE: CPT

## 2022-05-24 PROCEDURE — 80053 COMPREHEN METABOLIC PANEL: CPT

## 2022-05-24 PROCEDURE — 85025 COMPLETE CBC W/AUTO DIFF WBC: CPT

## 2022-05-24 PROCEDURE — 83921 ORGANIC ACID SINGLE QUANT: CPT

## 2022-05-24 PROCEDURE — 36415 COLL VENOUS BLD VENIPUNCTURE: CPT

## 2022-05-27 LAB — METHYLMALONATE SERPL-SCNC: 0.28 UMOL/L (ref 0–0.4)

## 2022-06-01 ENCOUNTER — OFFICE VISIT (OUTPATIENT)
Dept: MEDICAL GROUP | Facility: PHYSICIAN GROUP | Age: 52
End: 2022-06-01
Payer: COMMERCIAL

## 2022-06-01 VITALS
HEIGHT: 70 IN | RESPIRATION RATE: 14 BRPM | SYSTOLIC BLOOD PRESSURE: 104 MMHG | OXYGEN SATURATION: 94 % | BODY MASS INDEX: 19.9 KG/M2 | WEIGHT: 139 LBS | HEART RATE: 62 BPM | DIASTOLIC BLOOD PRESSURE: 68 MMHG | TEMPERATURE: 97.3 F

## 2022-06-01 DIAGNOSIS — R73.09 ELEVATED GLUCOSE: ICD-10-CM

## 2022-06-01 DIAGNOSIS — E78.00 ELEVATED CHOLESTEROL: ICD-10-CM

## 2022-06-01 DIAGNOSIS — F33.41 RECURRENT MAJOR DEPRESSIVE DISORDER, IN PARTIAL REMISSION (HCC): ICD-10-CM

## 2022-06-01 DIAGNOSIS — R71.8 ELEVATED MCV: ICD-10-CM

## 2022-06-01 PROBLEM — R42 POSTURAL LIGHTHEADEDNESS: Status: RESOLVED | Noted: 2021-08-09 | Resolved: 2022-06-01

## 2022-06-01 PROCEDURE — 99214 OFFICE O/P EST MOD 30 MIN: CPT | Performed by: STUDENT IN AN ORGANIZED HEALTH CARE EDUCATION/TRAINING PROGRAM

## 2022-06-01 ASSESSMENT — PATIENT HEALTH QUESTIONNAIRE - PHQ9: CLINICAL INTERPRETATION OF PHQ2 SCORE: 0

## 2022-06-01 ASSESSMENT — FIBROSIS 4 INDEX: FIB4 SCORE: 1.35

## 2022-06-01 NOTE — ASSESSMENT & PLAN NOTE
"Prior labs noted MCV - 100.2  Denied regular alcohol use.   Subsequent B12/folate labs normal.  ... then \"ate spinach every day\"  New labs improved, with MCV - 95.8  And normal homocysteine level.   Unclear reason for prior change, but resolved.   - Resolved  (normal MCV now).   "

## 2022-06-01 NOTE — ASSESSMENT & PLAN NOTE
Chronic and ongoing, elevation of cholesterol.   LDL-120, total-221.  ... but HDL-89, and ASCVD~1.8%.  Denies:  angina, palpitations, or dyspnea   -Advised on diet and exercise.   -No meds, for now.

## 2022-06-01 NOTE — PATIENT INSTRUCTIONS
Please call [phone]) to schedule with a new provider, as I will be leaving soon.  ...  You may wish to try the [facility] location ([facility] [address]), as they have more providers (and hopefully more openings).     Thank you.

## 2022-06-01 NOTE — PROGRESS NOTES
Established Patient     Gera Velez is a 51 y.o. male.    Chief Complaint   Patient presents with   • Follow-Up     6 months             Problems addressed this visit:     This note uses problem-based documentation.  Subjective HPI is included in Assessment & Plan, at bottom of note.   Problem   Elevated glucose  (Resolved)   Elevated MCV  (Resolved)   Elevated Cholesterol   Recurrent Major Depressive Disorder, in Partial Remission (Hcc)                           Past Medical History:   Diagnosis Date   • Allergy     seasonal   • Depression        Patient Active Problem List   Diagnosis   • Tobacco abuse   • Recurrent major depressive disorder, in partial remission (HCC)   • Elevated cholesterol   • History of colon polyps   • Elevated MCV  (Resolved)   • Elevated glucose  (Resolved)       No past surgical history on file.    Family History   Problem Relation Age of Onset   • Diabetes Mother    • Cancer Mother         brain   • Cancer Father         colon       Social History     Tobacco Use   • Smoking status: Former Smoker     Packs/day: 0.50     Years: 33.00     Pack years: 16.50     Types: Cigarettes   • Smokeless tobacco: Never Used   • Tobacco comment: ex smoker, but now vaping... will try to reduce   Substance Use Topics   • Alcohol use: Yes     Comment: 1 per day       Current medications:  (including new changes):  Current Outpatient Medications   Medication Sig Dispense Refill   • citalopram (CELEXA) 10 MG tablet Take 1 Tablet by mouth every day. 90 Tablet 4     No current facility-administered medications for this visit.       Allergies as of 06/01/2022 - Reviewed 06/01/2022   Allergen Reaction Noted   • Pcn [penicillins] Unspecified 05/01/2018                   Review of Symptoms   (as noted elsewhere, and .....)   GEN/CONST:   Denies fever, chills,    CARDIO:   Denies chest pain, or edema.    RESP:   Denies shortness of breath, or coughing.  GI:    Denies nausea, vomiting, diarrhea,      PSYCH:   "Denies anxiety, depression,           Physical Exam  /68 (BP Location: Left arm, Patient Position: Sitting, BP Cuff Size: Adult)   Pulse 62   Temp 36.3 °C (97.3 °F) (Temporal)   Resp 14   Ht 1.778 m (5' 10\")   Wt 63 kg (139 lb)   SpO2 94%   BMI 19.94 kg/m²   General:  Alert and oriented, No apparent distress.    Lungs: Clear to auscultation bilaterally.  No wheezes or rales.  Cardiovascular: Regular rate and rhythm.  No murmurs, or gallops.  Abdomen:  Soft.  Non-tender.  No rebound or guarding.   Extremities:  No leg edema.  Good general motion of extremities.   Psychological: Appears to have normal mood and affect.        Labs:  Recent / Last-Prior labs reviewed.    CBC, CMP, Lipids, TSH, A1c and homocysteine                             Assessment & Plan  (with subjective history and orders):  Of note, the list below is not in a specific nor sortable order.      Problem List Items Addressed This Visit     Elevated cholesterol     Chronic and ongoing, elevation of cholesterol.   LDL-120, total-221.  ... but HDL-89, and ASCVD~1.8%.  Denies:  angina, palpitations, or dyspnea   -Advised on diet and exercise.   -No meds, for now.            Elevated glucose  (Resolved)     Prior lab with glucose-105.  New labs with glucose-90, A1c-5.2.  Denies:  polyphagia, polydipsia, polyuria.  - Resolved.  (normal glucose and a1c).            Elevated MCV  (Resolved)     Prior labs noted MCV - 100.2  Denied regular alcohol use.   Subsequent B12/folate labs normal.  ... then \"ate spinach every day\"  New labs improved, with MCV - 95.8  And normal homocysteine level.   Unclear reason for prior change, but resolved.   - Resolved  (normal MCV now).            Recurrent major depressive disorder, in partial remission (HCC)     Had previously had chronic depression and had been reluctant to use medication before, due to a past addition (long ago), but started about early 8/2021.  Started on Celexa 20, and felt \"high\" from it, " and too euphoric, so he was concerned about this.  So he decreased to Half a dose, and then felt better.   ... Currently, he continues to feel well, and feels that the depression is well controlled, without any side effects. ...  He notes he has felt well, for well over half a year, and would be interested in trying to reduce or stop his medication at this point.  ... PHQ-2 = 0  (both this time and last time)  - Discussed options, and patient would like       to gradually reduce dose, and taper-off.   - Patient can try reducing dose to citalopram.  ... alternate 1 tab and Half-tab, for a few weeks.   ... then Half-tab, daily, for 1-2 months.   ... then Half-tab, every other day, for a few weeks.   ... then can try to stop.   - If start feeling worse, go back up to regular dose.  - follow-up with new PCP (as I will be leaving soon).           Of note, the above list is not in a specific nor sortable order.                  Diagnosis Table, with orders:  1. Recurrent major depressive disorder, in partial remission (HCC)     2. Elevated cholesterol     3. Elevated glucose  (Resolved)     4. Elevated MCV  (Resolved)                   Follow-up:  Please establish with a new provider,           as I will be leaving the clinic, soon.               A computerized dictation system may have been used in this note.    Despite review, there may be some errors in spelling or grammar.    Sina Castillo M.D.  6/1/2022

## 2022-06-01 NOTE — ASSESSMENT & PLAN NOTE
"Had previously had chronic depression and had been reluctant to use medication before, due to a past addition (long ago), but started about early 8/2021.  Started on Celexa 20, and felt \"high\" from it, and too euphoric, so he was concerned about this.  So he decreased to Half a dose, and then felt better.   ... Currently, he continues to feel well, and feels that the depression is well controlled, without any side effects. ...  He notes he has felt well, for well over half a year, and would be interested in trying to reduce or stop his medication at this point.  ... PHQ-2 = 0  (both this time and last time)  - Discussed options, and patient would like       to gradually reduce dose, and taper-off.   - Patient can try reducing dose to citalopram.  ... alternate 1 tab and Half-tab, for a few weeks.   ... then Half-tab, daily, for 1-2 months.   ... then Half-tab, every other day, for a few weeks.   ... then can try to stop.   - If start feeling worse, go back up to regular dose.  - follow-up with new PCP (as I will be leaving soon).  "

## 2022-06-01 NOTE — ASSESSMENT & PLAN NOTE
Prior lab with glucose-105.  New labs with glucose-90, A1c-5.2.  Denies:  polyphagia, polydipsia, polyuria.  - Resolved.  (normal glucose and a1c).

## 2023-01-05 ENCOUNTER — APPOINTMENT (RX ONLY)
Dept: URBAN - METROPOLITAN AREA CLINIC 22 | Facility: CLINIC | Age: 53
Setting detail: DERMATOLOGY
End: 2023-01-05

## 2023-01-05 DIAGNOSIS — L81.4 OTHER MELANIN HYPERPIGMENTATION: ICD-10-CM

## 2023-01-05 DIAGNOSIS — L57.0 ACTINIC KERATOSIS: ICD-10-CM

## 2023-01-05 DIAGNOSIS — D18.0 HEMANGIOMA: ICD-10-CM

## 2023-01-05 DIAGNOSIS — D22 MELANOCYTIC NEVI: ICD-10-CM

## 2023-01-05 DIAGNOSIS — Z71.89 OTHER SPECIFIED COUNSELING: ICD-10-CM

## 2023-01-05 DIAGNOSIS — L82.1 OTHER SEBORRHEIC KERATOSIS: ICD-10-CM

## 2023-01-05 PROBLEM — D18.01 HEMANGIOMA OF SKIN AND SUBCUTANEOUS TISSUE: Status: ACTIVE | Noted: 2023-01-05

## 2023-01-05 PROBLEM — D22.5 MELANOCYTIC NEVI OF TRUNK: Status: ACTIVE | Noted: 2023-01-05

## 2023-01-05 PROCEDURE — 17000 DESTRUCT PREMALG LESION: CPT

## 2023-01-05 PROCEDURE — 17003 DESTRUCT PREMALG LES 2-14: CPT

## 2023-01-05 PROCEDURE — ? LIQUID NITROGEN

## 2023-01-05 PROCEDURE — ? COUNSELING

## 2023-01-05 PROCEDURE — 99213 OFFICE O/P EST LOW 20 MIN: CPT | Mod: 25

## 2023-01-05 PROCEDURE — ? SUNSCREEN RECOMMENDATIONS

## 2023-01-05 ASSESSMENT — LOCATION SIMPLE DESCRIPTION DERM
LOCATION SIMPLE: LEFT EAR
LOCATION SIMPLE: LEFT CHEEK
LOCATION SIMPLE: ABDOMEN
LOCATION SIMPLE: LEFT UPPER BACK
LOCATION SIMPLE: RIGHT LOWER BACK

## 2023-01-05 ASSESSMENT — LOCATION DETAILED DESCRIPTION DERM
LOCATION DETAILED: EPIGASTRIC SKIN
LOCATION DETAILED: RIGHT SUPERIOR MEDIAL MIDBACK
LOCATION DETAILED: LEFT CENTRAL MALAR CHEEK
LOCATION DETAILED: LEFT INFERIOR HELIX
LOCATION DETAILED: LEFT SUPERIOR MEDIAL UPPER BACK
LOCATION DETAILED: LEFT SUPERIOR HELIX

## 2023-01-05 ASSESSMENT — LOCATION ZONE DERM
LOCATION ZONE: FACE
LOCATION ZONE: EAR
LOCATION ZONE: TRUNK

## 2023-01-05 NOTE — PROCEDURE: LIQUID NITROGEN
Consent: The patient's consent was obtained including but not limited to risks of crusting, scabbing, blistering, scarring, darker or lighter pigmentary change, recurrence, incomplete removal and infection.
Render Note In Bullet Format When Appropriate: No
Show Aperture Variable?: Yes
Detail Level: Detailed
Post-Care Instructions: I reviewed with the patient in detail post-care instructions. Patient is to wear sunprotection, and avoid picking at any of the treated lesions. Pt may apply Vaseline to crusted or scabbing areas.
Duration Of Freeze Thaw-Cycle (Seconds): 3
Number Of Freeze-Thaw Cycles: 2 freeze-thaw cycles

## 2023-01-17 SDOH — ECONOMIC STABILITY: FOOD INSECURITY: WITHIN THE PAST 12 MONTHS, THE FOOD YOU BOUGHT JUST DIDN'T LAST AND YOU DIDN'T HAVE MONEY TO GET MORE.: NEVER TRUE

## 2023-01-17 SDOH — ECONOMIC STABILITY: HOUSING INSECURITY: IN THE LAST 12 MONTHS, HOW MANY PLACES HAVE YOU LIVED?: 1

## 2023-01-17 SDOH — HEALTH STABILITY: PHYSICAL HEALTH: ON AVERAGE, HOW MANY DAYS PER WEEK DO YOU ENGAGE IN MODERATE TO STRENUOUS EXERCISE (LIKE A BRISK WALK)?: 5 DAYS

## 2023-01-17 SDOH — ECONOMIC STABILITY: INCOME INSECURITY: IN THE LAST 12 MONTHS, WAS THERE A TIME WHEN YOU WERE NOT ABLE TO PAY THE MORTGAGE OR RENT ON TIME?: NO

## 2023-01-17 SDOH — ECONOMIC STABILITY: FOOD INSECURITY: WITHIN THE PAST 12 MONTHS, YOU WORRIED THAT YOUR FOOD WOULD RUN OUT BEFORE YOU GOT MONEY TO BUY MORE.: NEVER TRUE

## 2023-01-17 SDOH — HEALTH STABILITY: PHYSICAL HEALTH: ON AVERAGE, HOW MANY MINUTES DO YOU ENGAGE IN EXERCISE AT THIS LEVEL?: 60 MIN

## 2023-01-17 SDOH — ECONOMIC STABILITY: INCOME INSECURITY: HOW HARD IS IT FOR YOU TO PAY FOR THE VERY BASICS LIKE FOOD, HOUSING, MEDICAL CARE, AND HEATING?: NOT HARD AT ALL

## 2023-01-17 SDOH — HEALTH STABILITY: MENTAL HEALTH
STRESS IS WHEN SOMEONE FEELS TENSE, NERVOUS, ANXIOUS, OR CAN'T SLEEP AT NIGHT BECAUSE THEIR MIND IS TROUBLED. HOW STRESSED ARE YOU?: VERY MUCH

## 2023-01-17 ASSESSMENT — SOCIAL DETERMINANTS OF HEALTH (SDOH)
HOW MANY DRINKS CONTAINING ALCOHOL DO YOU HAVE ON A TYPICAL DAY WHEN YOU ARE DRINKING: 3 OR 4
HOW OFTEN DO YOU ATTENT MEETINGS OF THE CLUB OR ORGANIZATION YOU BELONG TO?: NEVER
IN A TYPICAL WEEK, HOW MANY TIMES DO YOU TALK ON THE PHONE WITH FAMILY, FRIENDS, OR NEIGHBORS?: MORE THAN THREE TIMES A WEEK
HOW OFTEN DO YOU ATTEND CHURCH OR RELIGIOUS SERVICES?: NEVER
HOW OFTEN DO YOU ATTENT MEETINGS OF THE CLUB OR ORGANIZATION YOU BELONG TO?: NEVER
IN A TYPICAL WEEK, HOW MANY TIMES DO YOU TALK ON THE PHONE WITH FAMILY, FRIENDS, OR NEIGHBORS?: MORE THAN THREE TIMES A WEEK
DO YOU BELONG TO ANY CLUBS OR ORGANIZATIONS SUCH AS CHURCH GROUPS UNIONS, FRATERNAL OR ATHLETIC GROUPS, OR SCHOOL GROUPS?: NO
HOW OFTEN DO YOU GET TOGETHER WITH FRIENDS OR RELATIVES?: ONCE A WEEK
WITHIN THE PAST 12 MONTHS, YOU WORRIED THAT YOUR FOOD WOULD RUN OUT BEFORE YOU GOT THE MONEY TO BUY MORE: NEVER TRUE
DO YOU BELONG TO ANY CLUBS OR ORGANIZATIONS SUCH AS CHURCH GROUPS UNIONS, FRATERNAL OR ATHLETIC GROUPS, OR SCHOOL GROUPS?: NO
HOW OFTEN DO YOU HAVE A DRINK CONTAINING ALCOHOL: 4 OR MORE TIMES A WEEK
HOW OFTEN DO YOU HAVE SIX OR MORE DRINKS ON ONE OCCASION: MONTHLY
HOW OFTEN DO YOU ATTEND CHURCH OR RELIGIOUS SERVICES?: NEVER
HOW OFTEN DO YOU GET TOGETHER WITH FRIENDS OR RELATIVES?: ONCE A WEEK
HOW HARD IS IT FOR YOU TO PAY FOR THE VERY BASICS LIKE FOOD, HOUSING, MEDICAL CARE, AND HEATING?: NOT HARD AT ALL

## 2023-01-17 ASSESSMENT — LIFESTYLE VARIABLES
HOW OFTEN DO YOU HAVE SIX OR MORE DRINKS ON ONE OCCASION: MONTHLY
HOW OFTEN DO YOU HAVE A DRINK CONTAINING ALCOHOL: 4 OR MORE TIMES A WEEK
HOW MANY STANDARD DRINKS CONTAINING ALCOHOL DO YOU HAVE ON A TYPICAL DAY: 3 OR 4
AUDIT-C TOTAL SCORE: 7
SKIP TO QUESTIONS 9-10: 0

## 2023-01-20 ENCOUNTER — OFFICE VISIT (OUTPATIENT)
Dept: MEDICAL GROUP | Facility: PHYSICIAN GROUP | Age: 53
End: 2023-01-20
Payer: COMMERCIAL

## 2023-01-20 VITALS
HEIGHT: 70 IN | SYSTOLIC BLOOD PRESSURE: 128 MMHG | BODY MASS INDEX: 20.33 KG/M2 | TEMPERATURE: 98.2 F | HEART RATE: 66 BPM | DIASTOLIC BLOOD PRESSURE: 86 MMHG | RESPIRATION RATE: 16 BRPM | OXYGEN SATURATION: 94 % | WEIGHT: 142 LBS

## 2023-01-20 DIAGNOSIS — Z11.59 NEED FOR HEPATITIS C SCREENING TEST: ICD-10-CM

## 2023-01-20 DIAGNOSIS — E78.2 MIXED HYPERLIPIDEMIA: Chronic | ICD-10-CM

## 2023-01-20 DIAGNOSIS — F33.41 RECURRENT MAJOR DEPRESSIVE DISORDER, IN PARTIAL REMISSION (HCC): Chronic | ICD-10-CM

## 2023-01-20 DIAGNOSIS — Z72.0 TOBACCO ABUSE: Chronic | ICD-10-CM

## 2023-01-20 PROBLEM — E78.00 ELEVATED CHOLESTEROL: Chronic | Status: ACTIVE | Noted: 2020-08-04

## 2023-01-20 PROBLEM — R71.8 ELEVATED MCV: Status: RESOLVED | Noted: 2021-08-09 | Resolved: 2023-01-20

## 2023-01-20 PROBLEM — R73.09 ELEVATED GLUCOSE: Status: RESOLVED | Noted: 2021-10-26 | Resolved: 2023-01-20

## 2023-01-20 PROCEDURE — 99214 OFFICE O/P EST MOD 30 MIN: CPT | Performed by: NURSE PRACTITIONER

## 2023-01-20 ASSESSMENT — ENCOUNTER SYMPTOMS
SPUTUM PRODUCTION: 0
PALPITATIONS: 0
COUGH: 0
NEUROLOGICAL NEGATIVE: 1
GASTROINTESTINAL NEGATIVE: 1
CONSTITUTIONAL NEGATIVE: 1
SHORTNESS OF BREATH: 0
PSYCHIATRIC NEGATIVE: 1
FEVER: 0
EYES NEGATIVE: 1
MUSCULOSKELETAL NEGATIVE: 1

## 2023-01-20 ASSESSMENT — PATIENT HEALTH QUESTIONNAIRE - PHQ9
7. TROUBLE CONCENTRATING ON THINGS, SUCH AS READING THE NEWSPAPER OR WATCHING TELEVISION: SEVERAL DAYS
8. MOVING OR SPEAKING SO SLOWLY THAT OTHER PEOPLE COULD HAVE NOTICED. OR THE OPPOSITE, BEING SO FIGETY OR RESTLESS THAT YOU HAVE BEEN MOVING AROUND A LOT MORE THAN USUAL: SEVERAL DAYS
SUM OF ALL RESPONSES TO PHQ9 QUESTIONS 1 AND 2: 3
6. FEELING BAD ABOUT YOURSELF - OR THAT YOU ARE A FAILURE OR HAVE LET YOURSELF OR YOUR FAMILY DOWN: SEVERAL DAYS
4. FEELING TIRED OR HAVING LITTLE ENERGY: SEVERAL DAYS
9. THOUGHTS THAT YOU WOULD BE BETTER OFF DEAD, OR OF HURTING YOURSELF: SEVERAL DAYS
SUM OF ALL RESPONSES TO PHQ QUESTIONS 1-9: 10
5. POOR APPETITE OR OVEREATING: SEVERAL DAYS
3. TROUBLE FALLING OR STAYING ASLEEP OR SLEEPING TOO MUCH: SEVERAL DAYS
1. LITTLE INTEREST OR PLEASURE IN DOING THINGS: MORE THAN HALF THE DAYS
2. FEELING DOWN, DEPRESSED, IRRITABLE, OR HOPELESS: SEVERAL DAYS

## 2023-01-20 ASSESSMENT — FIBROSIS 4 INDEX: FIB4 SCORE: 1.38

## 2023-01-20 NOTE — LETTER
[facility]  KITA MillerP.  [address]  [place] [address]  Fax: [phone]   Authorization for Release/Disclosure of   Protected Health Information   Name: GERA VELEZ : 1970 SSN: [id number]   Address: [facility] [address] [place] [address] Phone:    [phone] (home) [phone] (work)   I authorize the entity listed below to release/disclose the PHI below to:   [facility]/Kimi Cheng D.N.P. and Kimi Cheng D.N.P.   Provider or Entity Name:     Address   City, State, Zip   Phone:      Fax:     Reason for request: continuity of care   Information to be released:    [  ] LAST COLONOSCOPY,  including any PATH REPORT and follow-up  [  ] LAST FIT/COLOGUARD RESULT [  ] LAST DEXA  [  ] LAST MAMMOGRAM  [  ] LAST PAP  [  ] LAST LABS [  ] RETINA EXAM REPORT  [  ] IMMUNIZATION RECORDS  [  ] Release all info      [  ] Check here and initial the line next to each item to release ALL health information INCLUDING  _____ Care and treatment for drug and / or alcohol abuse  _____ HIV testing, infection status, or AIDS  _____ Genetic Testing    DATES OF SERVICE OR TIME PERIOD TO BE DISCLOSED: _____________  I understand and acknowledge that:  * This Authorization may be revoked at any time by you in writing, except if your health information has already been used or disclosed.  * Your health information that will be used or disclosed as a result of you signing this authorization could be re-disclosed by the recipient. If this occurs, your re-disclosed health information may no longer be protected by State or Federal laws.  * You may refuse to sign this Authorization. Your refusal will not affect your ability to obtain treatment.  * This Authorization becomes effective upon signing and will  on (date) __________.      If no date is indicated, this Authorization will  one (1) year from the signature date.    Name: Gera Velez    Signature:(signed electronically)   Date:      1/20/2023       PLEASE FAX REQUESTED RECORDS BACK TO: [phone]

## 2023-01-20 NOTE — PROGRESS NOTES
Subjective:     CC:    Chief Complaint   Patient presents with    Establish Care     Medication refill.         HISTORY OF THE PRESENT ILLNESS: Patient is a pleasant 52 y.o. male, here today to establish care. Prior PCP was Dr whitfield. He has a history of elevated cholesterol, depression, nicotine dependence. No active concerns today. Has some questions about Citalopram.     The below problems were discussed/reviewed at this visit:    Problem   Mixed Hyperlipidemia    Not currently on cholesterol reducing medication  Daily cigarette smoker     History of Colon Polyps    Last colonoscopy 6/2022 at [facility]. States 1 polyp was removed. Was told recall in 5 years  - request records from GI     Tobacco Abuse    Former cigarette smoker x35 years; quit cigarettes 3/2022; has been vaping nicotine/CBD since. He is working on reducing nicotine in vaping.   Failed attempts with nicotine gum, patch, hypnosis. He had some success with chantix in the past but states insurance will no longer cover cost     Recurrent Major Depressive Disorder, in Partial Remission (Hcc)    Chronic depression for some years. Started on Celexa in 2021; currently on Celexa 10mg QD; had euphoric feeling at higher dose. He attempted weaning off last year but had mood swings so he is back on current dosing of 10mg. He has some days when irritability increases. He is inquiring about trying 15mg dosing.     Depression Screening    Little interest or pleasure in doing things?   More than half the days  Feeling down, depressed , or hopeless?  Several days  Trouble falling or staying asleep, or sleeping too much?   Several days  Feeling tired or having little energy?   Several days  Poor appetite or overeating?   Several days  Feeling bad about yourself - or that you are a failure or have let yourself or your family down?  Several days  Trouble concentrating on things, such as reading the newspaper or watching television?  Several days  Moving or  speaking so slowly that other people could have noticed.  Or the opposite - being so fidgety or restless that you have been moving around a lot more than usual?   Several days  Thoughts that you would be better off dead, or of hurting yourself?   Several days  Patient Health Questionnaire Score:  (!) 10      If depressive symptoms identified deferred to follow up visit unless specifically addressed in assesment and plan.    Interpretation of PHQ-9 Total Score   Score Severity   1-4 No Depression   5-9 Mild Depression   10-14 Moderate Depression   15-19 Moderately Severe Depression   20-27 Severe Depression       Elevated glucose  (Resolved) (Resolved)   Elevated MCV  (Resolved) (Resolved)       Patient Active Problem List   Diagnosis    Tobacco abuse    Recurrent major depressive disorder, in partial remission (HCC)    Mixed hyperlipidemia    History of colon polyps       Past Medical History:   Diagnosis Date    Allergy     seasonal    Depression     Elevated glucose  (Resolved) 10/26/2021    Elevated MCV  (Resolved) 8/9/2021        Current Outpatient Medications Ordered in Epic   Medication Sig Dispense Refill    citalopram (CELEXA) 10 MG tablet Take 1 Tablet by mouth every day. 90 Tablet 4     No current Epic-ordered facility-administered medications on file.        No past surgical history on file.     Allergies:  Pcn [penicillins]    Health Maintenance: Completed  - colonoscopy 6/2022; polyp x1; 5 year recall      ROS:   Review of Systems   Constitutional: Negative.  Negative for fever and malaise/fatigue.   HENT: Negative.     Eyes: Negative.         Wears prescription glasses   Respiratory:  Negative for cough, sputum production and shortness of breath.    Cardiovascular:  Negative for chest pain, palpitations and leg swelling.   Gastrointestinal: Negative.    Genitourinary: Negative.    Musculoskeletal: Negative.    Neurological: Negative.    Endo/Heme/Allergies: Negative.    Psychiatric/Behavioral:  "Negative.         Objective:     Exam: /86   Pulse 66   Temp 36.8 °C (98.2 °F)   Resp 16   Ht 1.778 m (5' 10\")   Wt 64.4 kg (142 lb)   SpO2 94%  Body mass index is 20.37 kg/m².    Physical Exam  Constitutional:       Appearance: Normal appearance.   Cardiovascular:      Rate and Rhythm: Normal rate and regular rhythm.      Pulses: Normal pulses.      Heart sounds: Normal heart sounds.   Pulmonary:      Effort: Pulmonary effort is normal.      Breath sounds: Normal breath sounds.   Musculoskeletal:         General: Normal range of motion.      Cervical back: Normal range of motion and neck supple.      Right lower leg: No edema.      Left lower leg: No edema.   Skin:     General: Skin is warm and dry.   Neurological:      General: No focal deficit present.      Mental Status: He is alert and oriented to person, place, and time.   Psychiatric:         Mood and Affect: Mood normal.         Behavior: Behavior normal.         Thought Content: Thought content normal.         Judgment: Judgment normal.       Assessment & Plan:   52 y.o. male with the following -    Problem List Items Addressed This Visit       Tobacco abuse (Chronic)     Encouraged vaping cessation; he is working on reducing nicotine in cartridges          Recurrent major depressive disorder, in partial remission (HCC) (Chronic)     Chronic, somewhat stable on current dosing. He has some days when mood swing occurs. We discussed increasing medication, since he had euphora at 20mg I think it is reasonable to try 15mg  - increase Celexa to 15mg QD; he will message me in 1-2 weeks on how he is doing and if okay I can send in prescription with new dosing.  - PHQ 9 is 10 today; no SI/self harm intentions; he is in steady monogamous relationship with male partner >15 years         Mixed hyperlipidemia (Chronic)      Latest Reference Range & Units 07/13/21 08:28 05/24/22 11:34   Cholesterol,Tot 100 - 199 mg/dL 219 (H) 221 (H)   Triglycerides 0 - 149 " mg/dL 92 59   HDL >=40 mg/dL 91 89   LDL <100 mg/dL 110 (H) 120 (H)   The 10-year ASCVD risk score (Vitaliy DK, et al., 2019) is: 3%   Increased risk with cigarette smoking;   - counseled on smoking cessation; heart healthy diet  - monitor fasting lipid with annual labs         Relevant Orders    CBC WITHOUT DIFFERENTIAL    Lipid Profile    Comp Metabolic Panel     Other Visit Diagnoses       Need for hepatitis C screening test        Relevant Orders    HCV Scrn ( [id number] [employer])          Educated in proper administration of medication(s) ordered today including safety, possible SE, risks, benefits, rationale and alternatives to therapy.     Return in about 5 months (around 2023) for Annual Visit.    Please note that this dictation was created using voice recognition software. I have made every reasonable attempt to correct obvious errors, but I expect that there are errors of grammar and possibly content that I did not discover before finalizing the note.

## 2023-01-20 NOTE — ASSESSMENT & PLAN NOTE
Latest Reference Range & Units 07/13/21 08:28 05/24/22 11:34   Cholesterol,Tot 100 - 199 mg/dL 219 (H) 221 (H)   Triglycerides 0 - 149 mg/dL 92 59   HDL >=40 mg/dL 91 89   LDL <100 mg/dL 110 (H) 120 (H)     The 10-year ASCVD risk score (Vitaliy CALZADA, et al., 2019) is: 3%   Increased risk with cigarette smoking;   - counseled on smoking cessation; heart healthy diet  - monitor fasting lipid with annual labs

## 2023-01-21 NOTE — ASSESSMENT & PLAN NOTE
Chronic, somewhat stable on current dosing. He has some days when mood swing occurs. We discussed increasing medication, since he had euphora at 20mg I think it is reasonable to try 15mg  - increase Celexa to 15mg QD; he will message me in 1-2 weeks on how he is doing and if okay I can send in prescription with new dosing.  - PHQ 9 is 10 today; no SI/self harm intentions; he is in steady monogamous relationship with male partner >15 years

## 2023-02-13 ENCOUNTER — PATIENT MESSAGE (OUTPATIENT)
Dept: MEDICAL GROUP | Facility: PHYSICIAN GROUP | Age: 53
End: 2023-02-13
Payer: COMMERCIAL

## 2023-02-13 DIAGNOSIS — F33.41 RECURRENT MAJOR DEPRESSIVE DISORDER, IN PARTIAL REMISSION (HCC): ICD-10-CM

## 2023-02-13 RX ORDER — CITALOPRAM HYDROBROMIDE 10 MG/1
15 TABLET ORAL DAILY
Qty: 135 TABLET | Refills: 3 | Status: SHIPPED | OUTPATIENT
Start: 2023-02-13 | End: 2024-03-07 | Stop reason: SDUPTHER

## 2023-02-14 NOTE — PROGRESS NOTES
1. Recurrent major depressive disorder, in partial remission (HCC)  Message received from patient. Doing well with 15mg dose. Refill sent  - citalopram (CELEXA) 10 MG tablet; Take 1.5 Tablets by mouth every day.  Dispense: 135 Tablet; Refill: 3

## 2023-04-20 ENCOUNTER — HOSPITAL ENCOUNTER (OUTPATIENT)
Dept: LAB | Facility: MEDICAL CENTER | Age: 53
End: 2023-04-20
Attending: NURSE PRACTITIONER
Payer: COMMERCIAL

## 2023-04-20 DIAGNOSIS — Z11.59 NEED FOR HEPATITIS C SCREENING TEST: ICD-10-CM

## 2023-04-20 DIAGNOSIS — E78.2 MIXED HYPERLIPIDEMIA: Chronic | ICD-10-CM

## 2023-04-20 LAB
ALBUMIN SERPL BCP-MCNC: 4.7 G/DL (ref 3.2–4.9)
ALBUMIN/GLOB SERPL: 2 G/DL
ALP SERPL-CCNC: 58 U/L (ref 30–99)
ALT SERPL-CCNC: 19 U/L (ref 2–50)
ANION GAP SERPL CALC-SCNC: 12 MMOL/L (ref 7–16)
AST SERPL-CCNC: 24 U/L (ref 12–45)
BILIRUB SERPL-MCNC: 0.6 MG/DL (ref 0.1–1.5)
BUN SERPL-MCNC: 12 MG/DL (ref 8–22)
CALCIUM ALBUM COR SERPL-MCNC: 8.7 MG/DL (ref 8.5–10.5)
CALCIUM SERPL-MCNC: 9.3 MG/DL (ref 8.5–10.5)
CHLORIDE SERPL-SCNC: 104 MMOL/L (ref 96–112)
CHOLEST SERPL-MCNC: 221 MG/DL (ref 100–199)
CO2 SERPL-SCNC: 25 MMOL/L (ref 20–33)
CREAT SERPL-MCNC: 0.84 MG/DL (ref 0.5–1.4)
ERYTHROCYTE [DISTWIDTH] IN BLOOD BY AUTOMATED COUNT: 41.3 FL (ref 35.9–50)
FASTING STATUS PATIENT QL REPORTED: NORMAL
GFR SERPLBLD CREATININE-BSD FMLA CKD-EPI: 104 ML/MIN/1.73 M 2
GLOBULIN SER CALC-MCNC: 2.4 G/DL (ref 1.9–3.5)
GLUCOSE SERPL-MCNC: 97 MG/DL (ref 65–99)
HCT VFR BLD AUTO: 45 % (ref 42–52)
HCV AB SER QL: NORMAL
HDLC SERPL-MCNC: 91 MG/DL
HGB BLD-MCNC: 15.2 G/DL (ref 14–18)
LDLC SERPL CALC-MCNC: 110 MG/DL
MCH RBC QN AUTO: 32.1 PG (ref 27–33)
MCHC RBC AUTO-ENTMCNC: 33.8 G/DL (ref 33.7–35.3)
MCV RBC AUTO: 95.1 FL (ref 81.4–97.8)
PLATELET # BLD AUTO: 241 K/UL (ref 164–446)
PMV BLD AUTO: 10 FL (ref 9–12.9)
POTASSIUM SERPL-SCNC: 4 MMOL/L (ref 3.6–5.5)
PROT SERPL-MCNC: 7.1 G/DL (ref 6–8.2)
RBC # BLD AUTO: 4.73 M/UL (ref 4.7–6.1)
SODIUM SERPL-SCNC: 141 MMOL/L (ref 135–145)
TRIGL SERPL-MCNC: 98 MG/DL (ref 0–149)
WBC # BLD AUTO: 5 K/UL (ref 4.8–10.8)

## 2023-04-20 PROCEDURE — 80053 COMPREHEN METABOLIC PANEL: CPT

## 2023-04-20 PROCEDURE — 80061 LIPID PANEL: CPT

## 2023-04-20 PROCEDURE — 36415 COLL VENOUS BLD VENIPUNCTURE: CPT

## 2023-04-20 PROCEDURE — 85027 COMPLETE CBC AUTOMATED: CPT

## 2023-04-20 PROCEDURE — G0472 HEP C SCREEN HIGH RISK/OTHER: HCPCS

## 2023-06-30 ENCOUNTER — OFFICE VISIT (OUTPATIENT)
Dept: MEDICAL GROUP | Facility: PHYSICIAN GROUP | Age: 53
End: 2023-06-30
Payer: COMMERCIAL

## 2023-06-30 VITALS
OXYGEN SATURATION: 98 % | HEIGHT: 70 IN | SYSTOLIC BLOOD PRESSURE: 118 MMHG | DIASTOLIC BLOOD PRESSURE: 62 MMHG | TEMPERATURE: 98.4 F | HEART RATE: 54 BPM | WEIGHT: 139 LBS | BODY MASS INDEX: 19.9 KG/M2 | RESPIRATION RATE: 16 BRPM

## 2023-06-30 DIAGNOSIS — Z00.00 ENCOUNTER FOR PREVENTATIVE ADULT HEALTH CARE EXAMINATION: ICD-10-CM

## 2023-06-30 DIAGNOSIS — Z72.0 TOBACCO ABUSE: Chronic | ICD-10-CM

## 2023-06-30 DIAGNOSIS — E78.2 MIXED HYPERLIPIDEMIA: Chronic | ICD-10-CM

## 2023-06-30 DIAGNOSIS — F33.41 RECURRENT MAJOR DEPRESSIVE DISORDER, IN PARTIAL REMISSION (HCC): Chronic | ICD-10-CM

## 2023-06-30 DIAGNOSIS — Z91.89 AT RISK FOR CANCER: ICD-10-CM

## 2023-06-30 PROCEDURE — 3074F SYST BP LT 130 MM HG: CPT | Performed by: NURSE PRACTITIONER

## 2023-06-30 PROCEDURE — 99396 PREV VISIT EST AGE 40-64: CPT | Performed by: NURSE PRACTITIONER

## 2023-06-30 PROCEDURE — 3078F DIAST BP <80 MM HG: CPT | Performed by: NURSE PRACTITIONER

## 2023-06-30 ASSESSMENT — FIBROSIS 4 INDEX: FIB4 SCORE: 1.23

## 2023-06-30 NOTE — PROGRESS NOTES
Subjective:     CC:   Chief Complaint   Patient presents with    Annual Exam    Spider Bite     Bite on chest, no sign of infection or illness would like to take a look       HPI:   Gera Velez II is a 53 y.o. male who presents for an annual exam. He is feeling well and has no complaints.  He had a spider bit on his chest last week; does not look infected; redness has reduced. Keep clean, dry.       Health Maintenance  Advanced directive: not currently  PT/vit D for falls prevention: no recent falls/fractures  Cholesterol Screening: TC//110  Diabetes Screening: FBG 97  AAA Screening: start screen at 65  Aspirin Use: none  Diet: heart healthy eating, adequate fiber  Exercise: recommend aerobic/resistance training 3-5x/week  Substance Abuse: daily vaper; ongoing counseling  Safe in relationship.   Seat belts discussed.  Sun protection used.    Cancer screening  Colorectal Cancer Screening: colonoscopy 7/2022; polyp x3; recall 5 yrs; hx colon cancer in dad  Lung Cancer Screening: hx 20 pk year smoker (1/2 PPD x 40 yrs); current daily vaper  Prostate Cancer Screening/PSA: no hematuria, dysuria, nocturia. PSA check at age 54    Infectious disease screening/Immunizations  --STI Screening: declined   --Practices safe sex.  --HIV Screening: declined  --Hepatitis C Screening: -ve 4/2023  --Immunizations: up to date  Immunization History   Administered Date(s) Administered    Hepatitis A Vaccine, Adult 08/18/2001, 03/12/2002    Hepatitis B Vaccine (Adol/Adult) 08/18/2001, 09/20/2001, 03/12/2002    Influenza Vaccine Pediatric Split - Historical Data 10/13/2007    Influenza Vaccine Quad Inj (Pf) 11/19/2018, 10/17/2020, 10/26/2021, 10/24/2022    Influenza, Unspecified - HISTORICAL DATA 10/13/2007    MODERNA BIVALENT BOOSTER SARS-COV-2 VACCINE (6+) 10/24/2022    MODERNA SARS-COV-2 VACCINE (12+) 03/20/2021, 04/17/2021, 11/14/2021, 05/24/2022    Pneumococcal polysaccharide vaccine (PPSV-23) 11/19/2018    Tdap Vaccine  05/01/2018    Zoster Vaccine Recombinant (RZV) (SHINGRIX) 06/24/2021, 09/02/2021     He  has a past medical history of Allergy, Depression, Elevated glucose  (Resolved) (10/26/2021), and Elevated MCV  (Resolved) (8/9/2021).  He  has no past surgical history on file.  Family History   Problem Relation Age of Onset    Diabetes Mother     Cancer Mother         brain    Cancer Father         colon     Social History     Tobacco Use    Smoking status: Former     Packs/day: 0.50     Years: 40.00     Pack years: 20.00     Types: Cigarettes    Smokeless tobacco: Never    Tobacco comments:     ex smoker, but now vaping... will try to reduce   Vaping Use    Vaping Use: Every day    Substances: Nicotine, CBD   Substance Use Topics    Alcohol use: Yes     Alcohol/week: 1.2 oz     Types: 2 Cans of beer per week     Comment: 7-8    Drug use: Yes     Types: Marijuana     Comment: past history of substance abuse, but not recently.        Patient Active Problem List    Diagnosis Date Noted    Mixed hyperlipidemia 08/04/2020    History of colon polyps 08/04/2020    Tobacco abuse 05/01/2018    Recurrent major depressive disorder, in partial remission (HCC) 05/01/2018       Current Outpatient Medications   Medication Sig Dispense Refill    citalopram (CELEXA) 10 MG tablet Take 1.5 Tablets by mouth every day. 135 Tablet 3     No current facility-administered medications for this visit.    (including changes today)  Allergies: Pcn [penicillins]    Review of Systems   Constitutional: Negative for fever, chills and malaise/fatigue.   HENT: Negative for congestion.    Eyes: Negative for pain.   Respiratory: Negative for cough and shortness of breath.    Cardiovascular: Negative for leg swelling.   Gastrointestinal: Negative for nausea, vomiting, abdominal pain and diarrhea.   Genitourinary: Negative for dysuria and hematuria.   Skin: Negative for rash. Healing spider bite, left chest  Neurological: Negative for dizziness, focal weakness and  "headaches.   Endo/Heme/Allergies: Does not bleed easily.   Psychiatric/Behavioral: Negative for depression.  The patient is not nervous/anxious.      Objective:     /62   Pulse (!) 54   Temp 36.9 °C (98.4 °F) (Temporal)   Resp 16   Ht 1.778 m (5' 10\")   Wt 63 kg (139 lb)   SpO2 98%   BMI 19.94 kg/m²   Body mass index is 19.94 kg/m².  Wt Readings from Last 4 Encounters:   06/30/23 63 kg (139 lb)   01/20/23 64.4 kg (142 lb)   06/01/22 63 kg (139 lb)   01/05/22 64.4 kg (142 lb)       Physical Exam:  Constitutional: Well-developed and well-nourished. Not diaphoretic. No distress.   Skin: Skin is warm and dry. No rash noted.  Head: Atraumatic without lesions.  Eyes: Conjunctivae and extraocular motions are normal. Pupils are equal, round, and reactive to light. No scleral icterus.   Ears:  External ears unremarkable. Tympanic membranes clear and intact.  Nose: Nares patent. Septum midline. Turbinates without erythema nor edema. No discharge.   Mouth/Throat: Dentition is okay, no visible cavities. Tongue normal. Oropharynx is clear and moist. Posterior pharynx without erythema or exudates.  Neck: Supple, trachea midline. Normal range of motion. No thyromegaly present. No lymphadenopathy--cervical or supraclavicular.  Cardiovascular: Regular rate and rhythm, S1 and S2 without murmur, rubs, or gallops.    Lungs: Effort normal. Clear to auscultation throughout. No adventitious sounds. No CVA tenderness.  Abdomen: Soft, non tender, and without distention. Active bowel sounds in all four quadrants. No rebound, guarding, masses or HSM.  : deferred  Rectal: deferred  Prostate: deferred  Extremities: No cyanosis, clubbing, erythema, nor edema. Distal pulses intact and symmetric.   Musculoskeletal: All major joints AROM full in all directions without pain.  Neurological: Alert and oriented x 3. DTRs 2+/3 and symmetric. No cranial nerve deficit. 5/5 myotomes. Sensation intact.  Psychiatric:  Behavior, mood, and " affect are appropriate.    Assessment and Plan:   1. Recurrent major depressive disorder, in partial remission (HCC)  Chronic, somewhat stable on current dosing. He has some days when mood swing occurs. We discussed increasing medication, since he had euphora at 20mg I think it is reasonable to try 15mg; doing better in this dose  - continue Celexa 15mg QD;   - no SI/self harm intentions; he is in steady monogamous relationship with male partner >15 years    2. Mixed hyperlipidemia   Latest Reference Range & Units 05/24/22 11:34 04/20/23 06:24   Cholesterol,Tot 100 - 199 mg/dL 221 (H) 221 (H)   Triglycerides 0 - 149 mg/dL 59 98   HDL >=40 mg/dL 89 91   LDL <100 mg/dL 120 (H) 110 (H)   The 10-year ASCVD risk score (Vitaliy CALZADA, et al., 2019) is: 2.9%   Increased risk with hx of cigarette smoking; currently vaping nicotine;   - counseled on tobacco use cessation; heart healthy diet  - monitor fasting lipid with annual labs    3. Tobacco abuse  4. At risk for cancer  Former cigarette smoker x35 years; quit cigarettes 3/2022; has been vaping nicotine/CBD since. He is working on reducing nicotine in vaping.   Failed attempts with nicotine gum, patch, hypnosis. He had some success with chantix in the past but states insurance will no longer cover cost.  Encouraged vaping cessation; he is working on reducing nicotine in cartridges   He is interested in LDCT lung cancer screening; referral placed today. No new cough, SOB, CP.    - REFERRAL TO LUNG CANCER SCREENING PROGRAM    5. Encounter for preventative adult health care examination  HCM:   Labs per orders.  Vaccinations per orders. None due today  Counseling about diet, supplements, exercise, skin care and safe sex.    Follow-up: Return in about 1 year (around 6/30/2024) for Annual Visit.

## 2023-07-01 NOTE — ASSESSMENT & PLAN NOTE
Latest Reference Range & Units 05/24/22 11:34 04/20/23 06:24   Cholesterol,Tot 100 - 199 mg/dL 221 (H) 221 (H)   Triglycerides 0 - 149 mg/dL 59 98   HDL >=40 mg/dL 89 91   LDL <100 mg/dL 120 (H) 110 (H)   The 10-year ASCVD risk score (Vitaliy CALZADA, et al., 2019) is: 2.9%   Increased risk with hx of cigarette smoking; currently vaping nicotine;   - counseled on tobacco use cessation; heart healthy diet  - monitor fasting lipid with annual labs

## 2023-07-01 NOTE — ASSESSMENT & PLAN NOTE
Encouraged vaping cessation; he is working on reducing nicotine in cartridges   He is interested in LDCT lung cancer screening; referral placed today. No new cough, SOB, CP.

## 2023-07-01 NOTE — ASSESSMENT & PLAN NOTE
Chronic, somewhat stable on current dosing. He has some days when mood swing occurs. We discussed increasing medication, since he had euphora at 20mg I think it is reasonable to try 15mg; doing better in this dose  - continue Celexa 15mg QD;   - no SI/self harm intentions; he is in steady monogamous relationship with male partner >15 years

## 2023-07-05 ENCOUNTER — TELEPHONE (OUTPATIENT)
Dept: SLEEP MEDICINE | Facility: MEDICAL CENTER | Age: 53
End: 2023-07-05
Payer: COMMERCIAL

## 2023-07-05 NOTE — TELEPHONE ENCOUNTER
1. Age 50-77 yrs of age? 53yrs    2. Total Years Smoking cigarettes? 41yrs    3. 20 pack year hx of smoking, or greater (average packs per day)?  41 pack yrs @ 1 pk/day    4. Current smoker or if quit, has pt quit within last 15 yrs? Quit 2 yrs prior    5. Completed Lung Cancer screen CT with [facility] previously? None    6. Anything noted on previous CT involving lungs? ([place], [place], Etc.) None    7. Any signs or symptoms of lung cancer? ( New / change to Cough, Wheezing, S.O.B., coughing up blood, unexplained weight loss within last year)?  None    8. Previous history of lung cancer?  None

## 2024-05-21 ENCOUNTER — DOCUMENTATION (OUTPATIENT)
Dept: HEALTH INFORMATION MANAGEMENT | Facility: OTHER | Age: 54
End: 2024-05-21
Payer: COMMERCIAL

## 2024-07-26 ENCOUNTER — APPOINTMENT (OUTPATIENT)
Dept: MEDICAL GROUP | Facility: PHYSICIAN GROUP | Age: 54
End: 2024-07-26
Payer: COMMERCIAL

## 2024-08-05 SDOH — ECONOMIC STABILITY: FOOD INSECURITY: WITHIN THE PAST 12 MONTHS, YOU WORRIED THAT YOUR FOOD WOULD RUN OUT BEFORE YOU GOT MONEY TO BUY MORE.: NEVER TRUE

## 2024-08-05 SDOH — ECONOMIC STABILITY: HOUSING INSECURITY: IN THE LAST 12 MONTHS, HOW MANY PLACES HAVE YOU LIVED?: 1

## 2024-08-05 SDOH — ECONOMIC STABILITY: FOOD INSECURITY: WITHIN THE PAST 12 MONTHS, THE FOOD YOU BOUGHT JUST DIDN'T LAST AND YOU DIDN'T HAVE MONEY TO GET MORE.: NEVER TRUE

## 2024-08-05 SDOH — HEALTH STABILITY: PHYSICAL HEALTH: ON AVERAGE, HOW MANY MINUTES DO YOU ENGAGE IN EXERCISE AT THIS LEVEL?: 0 MIN

## 2024-08-05 SDOH — ECONOMIC STABILITY: INCOME INSECURITY: IN THE LAST 12 MONTHS, WAS THERE A TIME WHEN YOU WERE NOT ABLE TO PAY THE MORTGAGE OR RENT ON TIME?: NO

## 2024-08-05 SDOH — HEALTH STABILITY: PHYSICAL HEALTH: ON AVERAGE, HOW MANY DAYS PER WEEK DO YOU ENGAGE IN MODERATE TO STRENUOUS EXERCISE (LIKE A BRISK WALK)?: 0 DAYS

## 2024-08-05 SDOH — ECONOMIC STABILITY: INCOME INSECURITY: HOW HARD IS IT FOR YOU TO PAY FOR THE VERY BASICS LIKE FOOD, HOUSING, MEDICAL CARE, AND HEATING?: NOT VERY HARD

## 2024-08-05 ASSESSMENT — SOCIAL DETERMINANTS OF HEALTH (SDOH)
HOW HARD IS IT FOR YOU TO PAY FOR THE VERY BASICS LIKE FOOD, HOUSING, MEDICAL CARE, AND HEATING?: NOT VERY HARD
HOW OFTEN DO YOU ATTENT MEETINGS OF THE CLUB OR ORGANIZATION YOU BELONG TO?: NEVER
HOW OFTEN DO YOU GET TOGETHER WITH FRIENDS OR RELATIVES?: ONCE A WEEK
DO YOU BELONG TO ANY CLUBS OR ORGANIZATIONS SUCH AS CHURCH GROUPS UNIONS, FRATERNAL OR ATHLETIC GROUPS, OR SCHOOL GROUPS?: NO
HOW OFTEN DO YOU HAVE SIX OR MORE DRINKS ON ONE OCCASION: MONTHLY
HOW OFTEN DO YOU ATTENT MEETINGS OF THE CLUB OR ORGANIZATION YOU BELONG TO?: NEVER
HOW OFTEN DO YOU ATTEND CHURCH OR RELIGIOUS SERVICES?: NEVER
HOW OFTEN DO YOU ATTEND CHURCH OR RELIGIOUS SERVICES?: NEVER
DO YOU BELONG TO ANY CLUBS OR ORGANIZATIONS SUCH AS CHURCH GROUPS UNIONS, FRATERNAL OR ATHLETIC GROUPS, OR SCHOOL GROUPS?: NO
IN A TYPICAL WEEK, HOW MANY TIMES DO YOU TALK ON THE PHONE WITH FAMILY, FRIENDS, OR NEIGHBORS?: ONCE A WEEK
WITHIN THE PAST 12 MONTHS, YOU WORRIED THAT YOUR FOOD WOULD RUN OUT BEFORE YOU GOT THE MONEY TO BUY MORE: NEVER TRUE
HOW OFTEN DO YOU HAVE A DRINK CONTAINING ALCOHOL: 4 OR MORE TIMES A WEEK
HOW OFTEN DO YOU GET TOGETHER WITH FRIENDS OR RELATIVES?: ONCE A WEEK
IN THE PAST 12 MONTHS, HAS THE ELECTRIC, GAS, OIL, OR WATER COMPANY THREATENED TO SHUT OFF SERVICE IN YOUR HOME?: NO
IN A TYPICAL WEEK, HOW MANY TIMES DO YOU TALK ON THE PHONE WITH FAMILY, FRIENDS, OR NEIGHBORS?: ONCE A WEEK
HOW MANY DRINKS CONTAINING ALCOHOL DO YOU HAVE ON A TYPICAL DAY WHEN YOU ARE DRINKING: 1 OR 2

## 2024-08-05 ASSESSMENT — LIFESTYLE VARIABLES
HOW MANY STANDARD DRINKS CONTAINING ALCOHOL DO YOU HAVE ON A TYPICAL DAY: 1 OR 2
SKIP TO QUESTIONS 9-10: 0
HOW OFTEN DO YOU HAVE A DRINK CONTAINING ALCOHOL: 4 OR MORE TIMES A WEEK
AUDIT-C TOTAL SCORE: 6
HOW OFTEN DO YOU HAVE SIX OR MORE DRINKS ON ONE OCCASION: MONTHLY

## 2024-08-08 ENCOUNTER — APPOINTMENT (OUTPATIENT)
Dept: MEDICAL GROUP | Facility: PHYSICIAN GROUP | Age: 54
End: 2024-08-08
Payer: COMMERCIAL

## 2024-08-08 VITALS
RESPIRATION RATE: 18 BRPM | SYSTOLIC BLOOD PRESSURE: 100 MMHG | HEIGHT: 70 IN | BODY MASS INDEX: 20.76 KG/M2 | WEIGHT: 145 LBS | OXYGEN SATURATION: 97 % | DIASTOLIC BLOOD PRESSURE: 58 MMHG | TEMPERATURE: 97.6 F | HEART RATE: 56 BPM

## 2024-08-08 DIAGNOSIS — Z13.0 SCREENING FOR ENDOCRINE, METABOLIC AND IMMUNITY DISORDER: ICD-10-CM

## 2024-08-08 DIAGNOSIS — Z13.29 SCREENING FOR ENDOCRINE, METABOLIC AND IMMUNITY DISORDER: ICD-10-CM

## 2024-08-08 DIAGNOSIS — F33.41 RECURRENT MAJOR DEPRESSIVE DISORDER, IN PARTIAL REMISSION (HCC): Chronic | ICD-10-CM

## 2024-08-08 DIAGNOSIS — E55.9 VITAMIN D DEFICIENCY: ICD-10-CM

## 2024-08-08 DIAGNOSIS — E78.2 MIXED HYPERLIPIDEMIA: Chronic | ICD-10-CM

## 2024-08-08 DIAGNOSIS — Z13.228 SCREENING FOR ENDOCRINE, METABOLIC AND IMMUNITY DISORDER: ICD-10-CM

## 2024-08-08 PROCEDURE — 3078F DIAST BP <80 MM HG: CPT

## 2024-08-08 PROCEDURE — 3074F SYST BP LT 130 MM HG: CPT

## 2024-08-08 PROCEDURE — 99214 OFFICE O/P EST MOD 30 MIN: CPT

## 2024-08-08 RX ORDER — SERTRALINE HYDROCHLORIDE 25 MG/1
25 TABLET, FILM COATED ORAL
COMMUNITY
Start: 2024-06-12

## 2024-08-08 RX ORDER — SERTRALINE HYDROCHLORIDE 100 MG/1
100 TABLET, FILM COATED ORAL
COMMUNITY
Start: 2024-05-01

## 2024-08-08 ASSESSMENT — PATIENT HEALTH QUESTIONNAIRE - PHQ9
4. FEELING TIRED OR HAVING LITTLE ENERGY: NOT AT ALL
SUM OF ALL RESPONSES TO PHQ9 QUESTIONS 1 AND 2: 0
3. TROUBLE FALLING OR STAYING ASLEEP OR SLEEPING TOO MUCH: NOT AT ALL
SUM OF ALL RESPONSES TO PHQ QUESTIONS 1-9: 0
5. POOR APPETITE OR OVEREATING: NOT AT ALL
7. TROUBLE CONCENTRATING ON THINGS, SUCH AS READING THE NEWSPAPER OR WATCHING TELEVISION: NOT AT ALL
1. LITTLE INTEREST OR PLEASURE IN DOING THINGS: NOT AT ALL
9. THOUGHTS THAT YOU WOULD BE BETTER OFF DEAD, OR OF HURTING YOURSELF: NOT AT ALL
6. FEELING BAD ABOUT YOURSELF - OR THAT YOU ARE A FAILURE OR HAVE LET YOURSELF OR YOUR FAMILY DOWN: NOT AL ALL
8. MOVING OR SPEAKING SO SLOWLY THAT OTHER PEOPLE COULD HAVE NOTICED. OR THE OPPOSITE, BEING SO FIGETY OR RESTLESS THAT YOU HAVE BEEN MOVING AROUND A LOT MORE THAN USUAL: NOT AT ALL
2. FEELING DOWN, DEPRESSED, IRRITABLE, OR HOPELESS: NOT AT ALL

## 2024-08-08 ASSESSMENT — ENCOUNTER SYMPTOMS
COUGH: 0
VOMITING: 0
BLOOD IN STOOL: 0
DIARRHEA: 0
TINGLING: 0
NAUSEA: 0
PALPITATIONS: 0
CHILLS: 0
CONSTIPATION: 0
ABDOMINAL PAIN: 0
DIZZINESS: 0
HEADACHES: 0
FEVER: 0
WHEEZING: 0
WEIGHT LOSS: 0
SHORTNESS OF BREATH: 0

## 2024-08-08 ASSESSMENT — FIBROSIS 4 INDEX: FIB4 SCORE: 1.26

## 2024-08-08 NOTE — PROGRESS NOTES
"Subjective:     Chief Complaint   Patient presents with    Establish Care     Gera Velez II is a 54 y.o. male, who is here today to establish care and discuss cholesterol lab work. His prior PCP was Kimi GARRETT.    Problem   Mixed Hyperlipidemia    Not currently on cholesterol reducing medication  Daily cigarette smoker  Lab Results   Component Value Date/Time    CHOLSTRLTOT 221 (H) 04/20/2023 0624    TRIGLYCERIDE 98 04/20/2023 0624    HDL 91 04/20/2023 0624     (H) 04/20/2023 0624   The 10-year ASCVD risk score (Vitaliy CALZADA, et al., 2019) is: 2.4%     Recurrent Major Depressive Disorder, in Partial Remission (Hcc)    Currently taking sertraline 125 mg daily.  Patient has been on this medication earlier this  Previously was on Celexa and was switched over to sertraline by [facility].  Also seeing therapy as well  Denies any SI/HI.     Allergies: Pcn [penicillins]    Current Outpatient Medications:     sertraline (ZOLOFT) 100 MG Tab, Take 100 mg by mouth., Disp: , Rfl:     sertraline (ZOLOFT) 25 MG tablet, Take 25 mg by mouth., Disp: , Rfl:      Review of Systems   Constitutional:  Negative for chills, fever and weight loss.   Respiratory:  Negative for cough, shortness of breath and wheezing.    Cardiovascular:  Negative for chest pain, palpitations and leg swelling.   Gastrointestinal:  Negative for abdominal pain, blood in stool, constipation, diarrhea, nausea and vomiting.   Genitourinary:  Negative for hematuria.   Skin:  Negative for itching and rash.   Neurological:  Negative for dizziness, tingling and headaches.     Health Maintenance: Completed    Objective:     /58 (BP Location: Left arm, Patient Position: Sitting, BP Cuff Size: Adult)   Pulse (!) 56   Temp 36.4 °C (97.6 °F) (Temporal)   Resp 18   Ht 1.778 m (5' 10\")   Wt 65.8 kg (145 lb)   SpO2 97%  Body mass index is 20.81 kg/m².    Physical Exam  Vitals reviewed.   Constitutional:       General: He is not in " acute distress.     Appearance: Normal appearance. He is not ill-appearing.   Cardiovascular:      Rate and Rhythm: Normal rate and regular rhythm.      Heart sounds: Normal heart sounds.   Pulmonary:      Effort: Pulmonary effort is normal.      Breath sounds: Normal breath sounds.   Abdominal:      General: Abdomen is flat.      Palpations: Abdomen is soft.   Musculoskeletal:         General: Normal range of motion.      Cervical back: Normal range of motion.   Skin:     General: Skin is warm and dry.   Neurological:      General: No focal deficit present.      Mental Status: He is alert.   Psychiatric:         Mood and Affect: Mood normal.         Behavior: Behavior normal.         Thought Content: Thought content normal.         Judgment: Judgment normal.        Assessment & Plan:     The following plan was discussed through shared decision making with the patient:    1. Mixed hyperlipidemia  Chronic, controlled.    Patient's last cholesterol levels in 2023 were slightly elevated.  Patient's ASCVD risk score indicating working on diet and lifestyle modifications at this time.  Patient due to get updated blood work  - Lipid Profile; Future    2. Recurrent major depressive disorder, in partial remission (HCC)  Chronic, controlled.  Patient stable on sertraline 125 mg as directed [facility].  Continue as directed by psychiatrist    3. Screening for endocrine, metabolic and immunity disorder  Patient establishing care today in office; due for updated lab work/screenings.  Will follow-up in [facility] with lab results.    Discussed with patient that if there is a significant amount of lab abnormalities we will have a follow-up appointment and I will indicate this is in my follow-up message with lab results.  Patient stated verbal understanding.  - CBC WITH DIFFERENTIAL; Future  - Comp Metabolic Panel; Future  - HEMOGLOBIN A1C; Future  - TSH WITH REFLEX TO FT4; Future    4. Vitamin D deficiency  See #4  - VITAMIN  D,25 HYDROXY (DEFICIENCY); Future      Return in about 6 months (around 2/8/2025) for Annual, Labs.       Please note that this dictation was created using voice recognition software. I have made every reasonable attempt to correct obvious errors, but I expect that there are errors of grammar and possibly content that I did not discover before finalizing the note.    GERRY Acosta  [facility]  [facility]